# Patient Record
Sex: MALE | Race: WHITE | Employment: OTHER | URBAN - METROPOLITAN AREA
[De-identification: names, ages, dates, MRNs, and addresses within clinical notes are randomized per-mention and may not be internally consistent; named-entity substitution may affect disease eponyms.]

---

## 2018-01-18 NOTE — MISCELLANEOUS
Dear Jeremiah Hebert : We missed you for your originally scheduled neurological followup appointment with Talat Maloney  Please call at your earliest convenience to reschedule this appointment      Sincerely,   Jacob Dorado      Extension: [phone]       Electronically signed by:Carol Davila   May 20 2016  3:19PM EST Co-author

## 2019-03-17 ENCOUNTER — HOSPITAL ENCOUNTER (INPATIENT)
Facility: HOSPITAL | Age: 84
LOS: 2 days | Discharge: NURSING, REHAB OR LONG-TERM CARE | DRG: 689 | End: 2019-03-20
Attending: EMERGENCY MEDICINE | Admitting: INTERNAL MEDICINE
Payer: COMMERCIAL

## 2019-03-17 ENCOUNTER — APPOINTMENT (EMERGENCY)
Dept: CT IMAGING | Facility: HOSPITAL | Age: 84
DRG: 689 | End: 2019-03-17
Payer: COMMERCIAL

## 2019-03-17 DIAGNOSIS — Z78.9 DECREASED ACTIVITIES OF DAILY LIVING (ADL): Primary | ICD-10-CM

## 2019-03-17 DIAGNOSIS — I10 HTN (HYPERTENSION): ICD-10-CM

## 2019-03-17 DIAGNOSIS — R45.851 SUICIDAL IDEATIONS: ICD-10-CM

## 2019-03-17 DIAGNOSIS — N39.0 URINARY TRACT INFECTION: ICD-10-CM

## 2019-03-17 LAB
ALBUMIN SERPL BCP-MCNC: 3 G/DL (ref 3.5–5)
ALP SERPL-CCNC: 60 U/L (ref 46–116)
ALT SERPL W P-5'-P-CCNC: 25 U/L (ref 12–78)
AMPHETAMINES SERPL QL SCN: NEGATIVE
ANION GAP SERPL CALCULATED.3IONS-SCNC: 9 MMOL/L (ref 4–13)
APAP SERPL-MCNC: <2 UG/ML (ref 10–30)
AST SERPL W P-5'-P-CCNC: 11 U/L (ref 5–45)
ATRIAL RATE: 81 BPM
BACTERIA UR QL AUTO: ABNORMAL /HPF
BARBITURATES UR QL: NEGATIVE
BASOPHILS # BLD AUTO: 0.11 THOUSANDS/ΜL (ref 0–0.1)
BASOPHILS NFR BLD AUTO: 2 % (ref 0–1)
BENZODIAZ UR QL: NEGATIVE
BILIRUB SERPL-MCNC: 1.1 MG/DL (ref 0.2–1)
BILIRUB UR QL STRIP: NEGATIVE
BUN SERPL-MCNC: 18 MG/DL (ref 5–25)
CALCIUM SERPL-MCNC: 8.8 MG/DL (ref 8.3–10.1)
CHLORIDE SERPL-SCNC: 103 MMOL/L (ref 100–108)
CLARITY UR: ABNORMAL
CO2 SERPL-SCNC: 28 MMOL/L (ref 21–32)
COCAINE UR QL: NEGATIVE
COLOR UR: YELLOW
CREAT SERPL-MCNC: 1.18 MG/DL (ref 0.6–1.3)
EOSINOPHIL # BLD AUTO: 0.08 THOUSAND/ΜL (ref 0–0.61)
EOSINOPHIL NFR BLD AUTO: 1 % (ref 0–6)
ERYTHROCYTE [DISTWIDTH] IN BLOOD BY AUTOMATED COUNT: 13.5 % (ref 11.6–15.1)
ETHANOL SERPL-MCNC: <3 MG/DL (ref 0–3)
GFR SERPL CREATININE-BSD FRML MDRD: 54 ML/MIN/1.73SQ M
GLUCOSE SERPL-MCNC: 106 MG/DL (ref 65–140)
GLUCOSE UR STRIP-MCNC: NEGATIVE MG/DL
HCT VFR BLD AUTO: 35.2 % (ref 36.5–49.3)
HGB BLD-MCNC: 11.6 G/DL (ref 12–17)
HGB UR QL STRIP.AUTO: ABNORMAL
IMM GRANULOCYTES # BLD AUTO: 0.04 THOUSAND/UL (ref 0–0.2)
IMM GRANULOCYTES NFR BLD AUTO: 1 % (ref 0–2)
KETONES UR STRIP-MCNC: ABNORMAL MG/DL
LEUKOCYTE ESTERASE UR QL STRIP: ABNORMAL
LYMPHOCYTES # BLD AUTO: 1.02 THOUSANDS/ΜL (ref 0.6–4.47)
LYMPHOCYTES NFR BLD AUTO: 18 % (ref 14–44)
MCH RBC QN AUTO: 34.9 PG (ref 26.8–34.3)
MCHC RBC AUTO-ENTMCNC: 33 G/DL (ref 31.4–37.4)
MCV RBC AUTO: 106 FL (ref 82–98)
METHADONE UR QL: NEGATIVE
MONOCYTES # BLD AUTO: 0.77 THOUSAND/ΜL (ref 0.17–1.22)
MONOCYTES NFR BLD AUTO: 13 % (ref 4–12)
NEUTROPHILS # BLD AUTO: 3.75 THOUSANDS/ΜL (ref 1.85–7.62)
NEUTS SEG NFR BLD AUTO: 65 % (ref 43–75)
NITRITE UR QL STRIP: POSITIVE
NON-SQ EPI CELLS URNS QL MICRO: ABNORMAL /HPF
NRBC BLD AUTO-RTO: 0 /100 WBCS
OPIATES UR QL SCN: NEGATIVE
P AXIS: 53 DEGREES
PCP UR QL: NEGATIVE
PH UR STRIP.AUTO: 6 [PH]
PLATELET # BLD AUTO: 315 THOUSANDS/UL (ref 149–390)
PMV BLD AUTO: 12.9 FL (ref 8.9–12.7)
POTASSIUM SERPL-SCNC: 4.4 MMOL/L (ref 3.5–5.3)
PR INTERVAL: 144 MS
PROT SERPL-MCNC: 6.1 G/DL (ref 6.4–8.2)
PROT UR STRIP-MCNC: ABNORMAL MG/DL
QRS AXIS: 32 DEGREES
QRSD INTERVAL: 90 MS
QT INTERVAL: 402 MS
QTC INTERVAL: 466 MS
RBC # BLD AUTO: 3.32 MILLION/UL (ref 3.88–5.62)
RBC #/AREA URNS AUTO: ABNORMAL /HPF
SALICYLATES SERPL-MCNC: <3 MG/DL (ref 3–20)
SODIUM SERPL-SCNC: 140 MMOL/L (ref 136–145)
SP GR UR STRIP.AUTO: 1.01 (ref 1–1.03)
T WAVE AXIS: 23 DEGREES
THC UR QL: NEGATIVE
TSH SERPL DL<=0.05 MIU/L-ACNC: 6.53 UIU/ML (ref 0.36–3.74)
UROBILINOGEN UR QL STRIP.AUTO: 0.2 E.U./DL
VENTRICULAR RATE: 81 BPM
WBC # BLD AUTO: 5.77 THOUSAND/UL (ref 4.31–10.16)
WBC #/AREA URNS AUTO: ABNORMAL /HPF

## 2019-03-17 PROCEDURE — 93005 ELECTROCARDIOGRAM TRACING: CPT

## 2019-03-17 PROCEDURE — 87086 URINE CULTURE/COLONY COUNT: CPT | Performed by: NURSE PRACTITIONER

## 2019-03-17 PROCEDURE — 85025 COMPLETE CBC W/AUTO DIFF WBC: CPT | Performed by: NURSE PRACTITIONER

## 2019-03-17 PROCEDURE — 80053 COMPREHEN METABOLIC PANEL: CPT | Performed by: NURSE PRACTITIONER

## 2019-03-17 PROCEDURE — 84439 ASSAY OF FREE THYROXINE: CPT | Performed by: INTERNAL MEDICINE

## 2019-03-17 PROCEDURE — 87186 SC STD MICRODIL/AGAR DIL: CPT | Performed by: NURSE PRACTITIONER

## 2019-03-17 PROCEDURE — 81001 URINALYSIS AUTO W/SCOPE: CPT | Performed by: NURSE PRACTITIONER

## 2019-03-17 PROCEDURE — 74177 CT ABD & PELVIS W/CONTRAST: CPT

## 2019-03-17 PROCEDURE — 36415 COLL VENOUS BLD VENIPUNCTURE: CPT | Performed by: NURSE PRACTITIONER

## 2019-03-17 PROCEDURE — 80329 ANALGESICS NON-OPIOID 1 OR 2: CPT | Performed by: NURSE PRACTITIONER

## 2019-03-17 PROCEDURE — 99285 EMERGENCY DEPT VISIT HI MDM: CPT

## 2019-03-17 PROCEDURE — 84443 ASSAY THYROID STIM HORMONE: CPT | Performed by: NURSE PRACTITIONER

## 2019-03-17 PROCEDURE — 80307 DRUG TEST PRSMV CHEM ANLYZR: CPT | Performed by: NURSE PRACTITIONER

## 2019-03-17 PROCEDURE — 87077 CULTURE AEROBIC IDENTIFY: CPT | Performed by: NURSE PRACTITIONER

## 2019-03-17 PROCEDURE — 71260 CT THORAX DX C+: CPT

## 2019-03-17 PROCEDURE — 70450 CT HEAD/BRAIN W/O DYE: CPT

## 2019-03-17 PROCEDURE — 80320 DRUG SCREEN QUANTALCOHOLS: CPT | Performed by: NURSE PRACTITIONER

## 2019-03-17 RX ORDER — OLANZAPINE 10 MG/1
5 INJECTION, POWDER, LYOPHILIZED, FOR SOLUTION INTRAMUSCULAR ONCE
Status: DISCONTINUED | OUTPATIENT
Start: 2019-03-17 | End: 2019-03-20 | Stop reason: HOSPADM

## 2019-03-17 RX ADMIN — IOHEXOL 100 ML: 350 INJECTION, SOLUTION INTRAVENOUS at 17:16

## 2019-03-17 NOTE — ED RE-EVALUATION NOTE
Patient medically clear for psychiatric evaluation and admission     Vernona Merlin, CRNP  03/17/19 1946

## 2019-03-17 NOTE — ED PROVIDER NOTES
History  Chief Complaint   Patient presents with    Flank Pain     PT PRESENTS TO ED WITH LEFT SIDED FLANK OAIN THAT STARTED A FEW DAYS AGO  -N/V/D     This is a 19-year-old male patient that is brought in by EMS  He is accompanied by his sister and his brother-in-law  They brought him for evaluation of depression  His sister is very concerned because he has been saying things that he is having suicidal thoughts  She reports that he has never done this in the past she is concerned about his well-being because he lives alone  Today he would not allow her in the house reports that he does not want to be bothered  Patient's sister reports that she called him on the phone within the last day and he reported being very down  She reports that his house is very unkempt  He continues to decline services such as meals on wheels and patient aide companies  Reports that he also has not seen a doctor for quite some time  Reports that he does not take any medication and that he will not likely continue any medication  She has been trying to get him to go into an assisted living where 1 of his grandchildren work but he has not agreed to do that at this point  Patient admittedly has depressed thoughts and thoughts of suicide but has no definitive plan  He does live alone  On a side note the patient has slight left sided pain  He reports that he slipped and the banister age abdomen the side a couple days ago  They did have this evaluated urgent care and nothing really came of it  I will perform further evaluation with CT of the abdomen to evaluate for any internal organ injury or any other internal problem such as cancer  Patient has not been evaluated for some time so will check laboratory studies obtain CT of the head make sure there is no intracranial mass or any other organic medical issues  Following workup if this is negative I will consult crisis for suicidal thoughts    The patient's sister is concerned enough about his wellbeing that she would agree to petition the [place] if the patient will not agree to sign voluntarily  None       History reviewed  No pertinent past medical history  History reviewed  No pertinent surgical history  History reviewed  No pertinent family history  I have reviewed and agree with the history as documented  Social History     Tobacco Use    Smoking status: Former Smoker    Smokeless tobacco: Never Used   Substance Use Topics    Alcohol use: Yes    Drug use: Never        Review of Systems   Constitutional: Negative for diaphoresis, fatigue and fever  HENT: Negative for congestion, ear pain, nosebleeds and sore throat  Eyes: Negative for photophobia, pain, discharge and visual disturbance  Respiratory: Negative for cough, choking, chest tightness, shortness of breath and wheezing  Cardiovascular: Negative for chest pain and palpitations  Gastrointestinal: Negative for abdominal distention, abdominal pain, diarrhea and vomiting  Genitourinary: Negative for dysuria, flank pain and frequency  Musculoskeletal: Negative for back pain, gait problem and joint swelling  Skin: Negative for color change and rash  Neurological: Negative for dizziness, syncope and headaches  Psychiatric/Behavioral: Negative for behavioral problems and confusion  The patient is not nervous/anxious  All other systems reviewed and are negative  Physical Exam  Physical Exam   Constitutional: He is oriented to person, place, and time  He appears well-developed  He appears cachectic  No distress  HENT:   Head: Normocephalic and atraumatic  Eyes: Pupils are equal, round, and reactive to light  Neck: Normal range of motion  Neck supple  Cardiovascular: Normal rate, regular rhythm, normal heart sounds and normal pulses  PMI is not displaced  Some tenderness along the anterior rib structures on the left side over the lower costal margin    No ecchymosis or crepitus  Pulmonary/Chest: Effort normal and breath sounds normal  No respiratory distress  Abdominal: Soft  He exhibits no distension  There is no tenderness  There is no guarding, no CVA tenderness and negative Marinelli's sign  Musculoskeletal: Normal range of motion  Lymphadenopathy:     He has no cervical adenopathy  Neurological: He is alert and oriented to person, place, and time  Skin: Skin is warm and dry  No rash noted  He is not diaphoretic  No pallor  Psychiatric: He exhibits a depressed mood  He expresses suicidal ideation  He expresses no suicidal plans  Vitals reviewed        Vital Signs  ED Triage Vitals   Temperature Pulse Respirations Blood Pressure SpO2   03/17/19 1512 03/17/19 1512 03/17/19 1512 03/17/19 1512 03/17/19 1512   97 6 °F (36 4 °C) 84 16 167/77 97 %      Temp Source Heart Rate Source Patient Position - Orthostatic VS BP Location FiO2 (%)   03/17/19 1512 03/17/19 1512 03/18/19 0304 03/17/19 1512 --   Oral Monitor Sitting Right arm       Pain Score       03/17/19 1512       5           Vitals:    03/18/19 0725 03/18/19 0729 03/18/19 1520 03/18/19 2303   BP: 152/75  149/66 162/94   Pulse: 65  63 89   Patient Position - Orthostatic VS: Lying Lying Sitting        qSOFA     Row Name 03/18/19 2303 03/18/19 1520 03/18/19 0725 03/18/19 0330 03/18/19 0304    Altered mental status GCS < 15              Respiratory Rate > / =22    0  0  0  0    Systolic BP < / =615  0  0  0  0  0    Q Sofa Score    0  0  0  0    Row Name 03/17/19 1830 03/17/19 1715 03/17/19 1615 03/17/19 1512       Altered mental status GCS < 15             Respiratory Rate > / =22  0  0  0  0     Systolic BP < / =549  0  0  0  0     Q Sofa Score  0  0  0  0           Visual Acuity      ED Medications  Medications   OLANZapine (ZyPREXA) IM injection 5 mg (0 mg Intramuscular Hold 3/18/19 0305)   enoxaparin (LOVENOX) subcutaneous injection 40 mg (40 mg Subcutaneous Given 3/18/19 0829)   cefTRIAXone (ROCEPHIN) 1,000 mg in dextrose 5 % 50 mL IVPB (1,000 mg Intravenous New Bag 3/19/19 0336)   sodium chloride 0 9 % infusion (75 mL/hr Intravenous New Bag 3/19/19 0058)   iohexol (OMNIPAQUE) 350 MG/ML injection (SINGLE-DOSE) 100 mL (100 mL Intravenous Given 3/17/19 1716)   ceftriaxone (ROCEPHIN) 1 g/50 mL in dextrose IVPB (1,000 mg Intravenous New Bag 3/18/19 0302)       Diagnostic Studies  Results Reviewed     Procedure Component Value Units Date/Time    T4, free [[id number]]  (Normal) Collected:  03/17/19 1619    Lab Status:  Final result Specimen:  Blood from Arm, Left Updated:  03/18/19 1104     Free T4 0 80 ng/dL     Urine culture [[id number]] Collected:  03/17/19 2200    Lab Status: In process Specimen:  Urine Updated:  03/17/19 2200    Urine Microscopic [[id number]]  (Abnormal) Collected:  03/17/19 1812    Lab Status:  Final result Specimen:  Urine, Clean Catch Updated:  03/17/19 1833     RBC, UA       Field obscured, unable to enumerate     /hpf     WBC, UA       Field obscured, unable to enumerate     /hpf     Epithelial Cells       Field obscured, unable to enumerate     /hpf     Bacteria, UA       Field obscured, unable to enumerate     /hpf    Rapid drug screen, urine [[id number]]  (Normal) Collected:  03/17/19 1812    Lab Status:  Final result Specimen:  Urine, Clean Catch Updated:  03/17/19 1827     Amph/Meth UR Negative     Barbiturate Ur Negative     Benzodiazepine Urine Negative     Cocaine Urine Negative     Methadone Urine Negative     Opiate Urine Negative     PCP Ur Negative     THC Urine Negative    Narrative:       FOR MEDICAL PURPOSES ONLY  IF CONFIRMATION NEEDED PLEASE CONTACT THE LAB WITHIN 5 DAYS    Drug Screen Cutoff Levels:  AMPHETAMINE/METHAMPHETAMINES  1000 ng/mL  BARBITURATES     200 ng/mL  BENZODIAZEPINES     200 ng/mL  COCAINE      300 ng/mL  METHADONE      300 ng/mL  OPIATES      300 ng/mL  PHENCYCLIDINE     25 ng/mL  THC       50 ng/mL    UA w Reflex to Microscopic [[id number]]  (Abnormal) Collected:  03/17/19 1812    Lab Status:  Final result Specimen:  Urine, Clean Catch Updated:  03/17/19 1823     Color, UA Yellow     Clarity, UA Cloudy     Specific [place], UA 1 010     pH, UA 6 0     Leukocytes, UA Moderate     Nitrite, UA Positive     Protein, UA 30 (1+) mg/dl      Glucose, UA Negative mg/dl      Ketones, UA Trace mg/dl      Urobilinogen, UA 0 2 E U /dl      Bilirubin, UA Negative     Blood, UA Moderate    Salicylate level [[id number]]  (Abnormal) Collected:  03/17/19 1619    Lab Status:  Final result Specimen:  Blood from Arm, Left Updated:  58/43/47 6247     Salicylate Lvl <3 mg/dL     TSH [[id number]]  (Abnormal) Collected:  03/17/19 1619    Lab Status:  Final result Specimen:  Blood from Arm, Left Updated:  03/17/19 1700     TSH 3RD GENERATON 6 526 uIU/mL     Narrative:       Patients undergoing fluorescein dye angiography may retain small amounts of fluorescein in the body for 48-72 hours post procedure  Samples containing fluorescein can produce falsely depressed TSH values  If the patient had this procedure,a specimen should be resubmitted post fluorescein clearance      Acetaminophen level [[id number]]  (Abnormal) Collected:  03/17/19 1619    Lab Status:  Final result Specimen:  Blood from Arm, Left Updated:  03/17/19 1700     Acetaminophen Level <2 0 ug/mL     Comprehensive metabolic panel [[id number]]  (Abnormal) Collected:  03/17/19 1619    Lab Status:  Final result Specimen:  Blood from Arm, Left Updated:  03/17/19 1648     Sodium 140 mmol/L      Potassium 4 4 mmol/L      Chloride 103 mmol/L      CO2 28 mmol/L      ANION GAP 9 mmol/L      BUN 18 mg/dL      Creatinine 1 18 mg/dL      Glucose 106 mg/dL      Calcium 8 8 mg/dL      AST 11 U/L      ALT 25 U/L      Alkaline Phosphatase 60 U/L      Total Protein 6 1 g/dL      Albumin 3 0 g/dL      Total Bilirubin 1 10 mg/dL      eGFR 54 ml/min/1 73sq m     Narrative:       National Kidney Disease Education Program recommendations are as follows:  GFR calculation is accurate only with a steady state creatinine  Chronic Kidney disease less than 60 ml/min/1 73 sq  meters  Kidney failure less than 15 ml/min/1 73 sq  meters  Ethanol [[id number]]  (Normal) Collected:  03/17/19 1619    Lab Status:  Final result Specimen:  Blood from Arm, Left Updated:  03/17/19 1643     Ethanol Lvl <3 mg/dL     CBC and differential [[id number]]  (Abnormal) Collected:  03/17/19 1619    Lab Status:  Final result Specimen:  Blood from Arm, Left Updated:  03/17/19 1627     WBC 5 77 Thousand/uL      RBC 3 32 Million/uL      Hemoglobin 11 6 g/dL      Hematocrit 35 2 %       fL      MCH 34 9 pg      MCHC 33 0 g/dL      RDW 13 5 %      MPV 12 9 fL      Platelets 684 Thousands/uL      nRBC 0 /100 WBCs      Neutrophils Relative 65 %      Immat GRANS % 1 %      Lymphocytes Relative 18 %      Monocytes Relative 13 %      Eosinophils Relative 1 %      Basophils Relative 2 %      Neutrophils Absolute 3 75 Thousands/µL      Immature Grans Absolute 0 04 Thousand/uL      Lymphocytes Absolute 1 02 Thousands/µL      Monocytes Absolute 0 77 Thousand/µL      Eosinophils Absolute 0 08 Thousand/µL      Basophils Absolute 0 11 Thousands/µL                  CT head without contrast   Final Result by Ella Pineda MD (03/17 1815)      No acute intracranial abnormality  Workstation performed: [id number]         CT chest abdomen pelvis w contrast   Final Result by Ella Pineda MD (03/17 1841)      1  No significant abnormality in the chest    2   Mild goiter  3   Two calculi, measuring 5 mm and 6 mm, in the right ureter at the junction of its proximal and mid thirds, where there appears to be a small stricture  Moderate right hydronephrosis              Workstation performed: [id number]                    Procedures  ECG 12 Lead Documentation  Date/Time: 3/17/2019 4:25 PM  Performed by: LUCI Ashley  Authorized by: LUCI Ashley     Indications / Diagnosis:  Depression  ECG reviewed by me, the ED Provider: yes    Patient location:  ED  Previous ECG:     Previous ECG:  Unavailable  Interpretation:     Interpretation: normal    Rate:     ECG rate:  81    ECG rate assessment: normal    Rhythm:     Rhythm: sinus rhythm    Ectopy:     Ectopy: PVCs      PVCs:  Infrequent  QRS:     QRS axis:  Normal  Conduction:     Conduction: normal    ST segments:     ST segments:  Normal  T waves:     T waves: normal             Phone Contacts  ED Phone Contact    ED Course  ED Course as of Mar 19 0620   Deborah Muir Mar 17, 2019   2320 Nursing increases say that the patient no longer wants to stay  He is getting agitated and loud with staff  I contacted the patient's sister who is willing to petition the [facility]  Number of Diagnoses or Management Options  Colonization status: new and requires workup  Decreased activities of daily living (ADL): new and requires workup  Depressed: new and requires workup  Verbalizes suicidal thoughts: new and requires workup  Diagnosis management comments: At this point the patient's workup is unremarkable  His urine appears as if it is colonized which is not uncommon given his age  Will follow culture and if he does culture 1 specific bacteria will follow up and treat accordingly  He does have some right hydronephrosis  Unable to determine the chronicity of this  No exquisite CVA tenderness over the right side  May need a urology consultation in the future    No affect on BUN and creatinine       Amount and/or Complexity of Data Reviewed  Clinical lab tests: reviewed and ordered  Tests in the radiology section of CPT®: reviewed and ordered  Tests in the medicine section of CPT®: reviewed and ordered  Obtain history from someone other than the patient: yes  Independent visualization of images, tracings, or specimens: yes    Patient Progress  Patient progress: stable      Disposition  Final diagnoses: Decreased activities of daily living (ADL)   Urinary tract infection     Time reflects when diagnosis was documented in both MDM as applicable and the Disposition within this note     Time User Action Codes Description Comment    3/17/2019  8:45 PM Karson Morales suicidal thoughts     3/17/2019  8:45 PM Agnes Levon [F32 9] Depressed     3/17/2019  8:46 PM Eli Janeth Add [R68 89] Decreased activities of daily living (ADL)     3/17/2019  8:47 PM Agnes Levon [Z22 9] Colonization status     3/17/2019  8:47 PM Eli Janeth Modify [Z22 9] Colonization status Urinary tract    3/18/2019  2:24 AM Tretha Tammi Add [N39 0] Urinary tract infection     3/18/2019  2:24 AM Tretha Tammi Modify [F32 9] Depressed     3/18/2019  2:24 AM Tretha Tammi Remove [R45 851] Verbalizes suicidal thoughts     3/18/2019  2:24 AM Tretha Tammi Modify [R68 89] Decreased activities of daily living (ADL)     3/18/2019  2:24 AM Tretha Tammi Remove [F32 9] Depressed     3/18/2019  2:24 AM Tretha Tammi Remove [Z22 9] Colonization status Urinary tract    3/18/2019  3:59 AM Joseph Taylor Add [R45 851] Suicidal ideations       ED Disposition     ED Disposition Condition Date/Time Comment    Admit Stable Mon Mar 18, 2019  2:30 AM Case was discussed with Hospitalist and the patient's admission status was agreed to be observation status to the service of Dr Claudia NAVA Documentation      Most Recent Value   Sending MD Dr Garcia      Follow-up Information    None         There are no discharge medications for this patient  No discharge procedures on file      ED Provider  Electronically Signed by           Candelario Lock  03/19/19 5957

## 2019-03-18 PROBLEM — N30.00 ACUTE CYSTITIS: Status: ACTIVE | Noted: 2019-03-18

## 2019-03-18 LAB
ANION GAP SERPL CALCULATED.3IONS-SCNC: 9 MMOL/L (ref 4–13)
BASOPHILS # BLD AUTO: 0.11 THOUSANDS/ΜL (ref 0–0.1)
BASOPHILS NFR BLD AUTO: 2 % (ref 0–1)
BUN SERPL-MCNC: 21 MG/DL (ref 5–25)
CALCIUM SERPL-MCNC: 8.9 MG/DL (ref 8.3–10.1)
CHLORIDE SERPL-SCNC: 107 MMOL/L (ref 100–108)
CO2 SERPL-SCNC: 25 MMOL/L (ref 21–32)
CREAT SERPL-MCNC: 1.21 MG/DL (ref 0.6–1.3)
EOSINOPHIL # BLD AUTO: 0.14 THOUSAND/ΜL (ref 0–0.61)
EOSINOPHIL NFR BLD AUTO: 2 % (ref 0–6)
ERYTHROCYTE [DISTWIDTH] IN BLOOD BY AUTOMATED COUNT: 13.4 % (ref 11.6–15.1)
GFR SERPL CREATININE-BSD FRML MDRD: 52 ML/MIN/1.73SQ M
GLUCOSE SERPL-MCNC: 109 MG/DL (ref 65–140)
HCT VFR BLD AUTO: 35.5 % (ref 36.5–49.3)
HGB BLD-MCNC: 11.9 G/DL (ref 12–17)
IMM GRANULOCYTES # BLD AUTO: 0.04 THOUSAND/UL (ref 0–0.2)
IMM GRANULOCYTES NFR BLD AUTO: 1 % (ref 0–2)
LYMPHOCYTES # BLD AUTO: 1.83 THOUSANDS/ΜL (ref 0.6–4.47)
LYMPHOCYTES NFR BLD AUTO: 26 % (ref 14–44)
MCH RBC QN AUTO: 35.4 PG (ref 26.8–34.3)
MCHC RBC AUTO-ENTMCNC: 33.5 G/DL (ref 31.4–37.4)
MCV RBC AUTO: 106 FL (ref 82–98)
MONOCYTES # BLD AUTO: 1 THOUSAND/ΜL (ref 0.17–1.22)
MONOCYTES NFR BLD AUTO: 14 % (ref 4–12)
NEUTROPHILS # BLD AUTO: 3.85 THOUSANDS/ΜL (ref 1.85–7.62)
NEUTS SEG NFR BLD AUTO: 55 % (ref 43–75)
NRBC BLD AUTO-RTO: 0 /100 WBCS
PLATELET # BLD AUTO: 327 THOUSANDS/UL (ref 149–390)
PMV BLD AUTO: 12.6 FL (ref 8.9–12.7)
POTASSIUM SERPL-SCNC: 4.2 MMOL/L (ref 3.5–5.3)
RBC # BLD AUTO: 3.36 MILLION/UL (ref 3.88–5.62)
SODIUM SERPL-SCNC: 141 MMOL/L (ref 136–145)
T4 FREE SERPL-MCNC: 0.8 NG/DL (ref 0.76–1.46)
WBC # BLD AUTO: 6.97 THOUSAND/UL (ref 4.31–10.16)

## 2019-03-18 PROCEDURE — G8987 SELF CARE CURRENT STATUS: HCPCS

## 2019-03-18 PROCEDURE — G8979 MOBILITY GOAL STATUS: HCPCS

## 2019-03-18 PROCEDURE — G8988 SELF CARE GOAL STATUS: HCPCS

## 2019-03-18 PROCEDURE — 97167 OT EVAL HIGH COMPLEX 60 MIN: CPT

## 2019-03-18 PROCEDURE — 80048 BASIC METABOLIC PNL TOTAL CA: CPT | Performed by: INTERNAL MEDICINE

## 2019-03-18 PROCEDURE — 85025 COMPLETE CBC W/AUTO DIFF WBC: CPT | Performed by: INTERNAL MEDICINE

## 2019-03-18 PROCEDURE — G8978 MOBILITY CURRENT STATUS: HCPCS

## 2019-03-18 PROCEDURE — 97163 PT EVAL HIGH COMPLEX 45 MIN: CPT

## 2019-03-18 RX ORDER — SODIUM CHLORIDE 9 MG/ML
75 INJECTION, SOLUTION INTRAVENOUS CONTINUOUS
Status: DISCONTINUED | OUTPATIENT
Start: 2019-03-18 | End: 2019-03-19

## 2019-03-18 RX ORDER — SODIUM CHLORIDE 9 MG/ML
125 INJECTION, SOLUTION INTRAVENOUS CONTINUOUS
Status: DISCONTINUED | OUTPATIENT
Start: 2019-03-18 | End: 2019-03-18

## 2019-03-18 RX ADMIN — CEFTRIAXONE SODIUM 1000 MG: 10 INJECTION, POWDER, FOR SOLUTION INTRAVENOUS at 03:02

## 2019-03-18 RX ADMIN — ENOXAPARIN SODIUM 40 MG: 40 INJECTION SUBCUTANEOUS at 08:29

## 2019-03-18 RX ADMIN — SODIUM CHLORIDE 125 ML/HR: 0.9 INJECTION, SOLUTION INTRAVENOUS at 04:44

## 2019-03-18 RX ADMIN — SODIUM CHLORIDE 75 ML/HR: 0.9 INJECTION, SOLUTION INTRAVENOUS at 12:02

## 2019-03-18 NOTE — H&P
H&P- Rusty Guerrero 5/8/1928, 80 y o  male MRN: [id number]    Unit/Bed#: -01 Encounter: [id number]    Primary Care Provider: Vance Ferris DO   Date and time admitted to hospital: 3/17/2019  3:07 PM        * Acute cystitis  Assessment & Plan  Patient presenting with confusion and was reportedly depressed with passive suicidal ideations prior to presentation  Workup was revealing for a grossly positive UA concerning for UTI  -patient himself does endorse confusion which has been worse as of late as well as decreased energy levels  -he was given ceftriaxone in the emergency department for UTI  Plan to continue ceftriaxone 1 g Q 24 hours  Will hydrate patient with normal saline  PT/OT ordered  He will remain on 1:1 pending psychiatric evaluation  Suicide precautions ordered  VTE PROPHYLAXIS:  + SCDs, Lovenox    CODE STATUS: FULL    Anticipated Length of Stay:  Patient will be admitted on an Observation basis with an anticipated length of stay of  2 midnights  Justification for Hospital Stay: UTI, confusion, passive S I      CHIEF COMPLAINT  confusion, depression/passive SI, weakness    HISTORY OF PRESENT ILLNESS  Rusty Guerrero this is a 80-year-old male with no known past medical history  Patient has not seen any physicians over the last few years  He lives by himself at home  He was brought in by family members for reportedly having increased depression symptoms and passive suicidal ideations  His house was reported to be unkempt as well  Initially, he was been evaluated for depression symptoms when he was noted to be disoriented and only oriented to self in the emergency department  Workup revealed grossly positive UA  He was given ceftriaxone for concern of UTI thought to exacerbate his confusion  Medicine was consulted for admission and management given UTI symptoms as well as depression/passive suicidal ideations  During my evaluation, patient was alert oriented x3    He denies any urinary symptoms  Denies any chest pain or shortness of breath  Denies any increased cough  Reports appetite has been good  He does report increased fatigue over the last few days as well as occasional disorientation and not knowing where he is which he himself  endorses  He denies any fevers or chills  REVIEW OF SYSTEMS  A comprehensive 10 point review system conducted all negative except as mentioned in HPI       PMH/PSH    History reviewed  No pertinent past medical history  History reviewed  No pertinent surgical history  ALLERGIES  No Known Allergies    HOME MEDICATIONS  No current facility-administered medications on file prior to encounter  No current outpatient medications on file prior to encounter  SOCIAL HISTORY     Marital Status:    Substance Use History:   Social History     Substance and Sexual Activity   Alcohol Use Yes     Social History     Tobacco Use   Smoking Status Former Smoker   Smokeless Tobacco Never Used     Social History     Substance and Sexual Activity   Drug Use Never       FAMILY HISTORY  Noncontributory to current presentation    OBJECTIVE    Vitals:   Blood Pressure: 156/72 (03/18/19 0304)  Pulse: 73 (03/18/19 0304)  Temperature: 97 6 °F (36 4 °C) (03/17/19 1512)  Temp Source: Oral (03/17/19 1512)  Respirations: 16 (03/18/19 0304)  Height: 5' 6" (167 6 cm) (03/17/19 1512)  Weight - Scale: 57 4 kg (126 lb 8 7 oz) (03/17/19 1512)  SpO2: 96 % (03/18/19 0304)    GENERAL: AAO x 3  NAD  HEENT: atraumatic, normocephalic  PERLAA  EOMI  NECK- supple, no JVD, no lymphadenopathy, no thryomegaly  CHEST:  Clear to auscultation bilaterally  CVS: S1, S2   Regular rate and rhythm, no murmurs, rub or gallops  ABDOMEN:  Soft, nontender, nondistended, normoactive bowel sounds  NEUROLOGICAL: CN II -XI grossly intact  No focal motor or sensory deficits  EXTREMITIES: No cyanosis/clubbing or edema      LAB DATA    Results Reviewed     Procedure Component Value Units Date/Time    Urine culture [[id number]] Collected:  03/17/19 2200    Lab Status: In process Specimen:  Urine Updated:  03/17/19 2200    Urine Microscopic [[id number]]  (Abnormal) Collected:  03/17/19 1812    Lab Status:  Final result Specimen:  Urine, Clean Catch Updated:  03/17/19 1833     RBC, UA       Field obscured, unable to enumerate     /hpf     WBC, UA       Field obscured, unable to enumerate     /hpf     Epithelial Cells       Field obscured, unable to enumerate     /hpf     Bacteria, UA       Field obscured, unable to enumerate     /hpf    Rapid drug screen, urine [[id number]]  (Normal) Collected:  03/17/19 1812    Lab Status:  Final result Specimen:  Urine, Clean Catch Updated:  03/17/19 1827     Amph/Meth UR Negative     Barbiturate Ur Negative     Benzodiazepine Urine Negative     Cocaine Urine Negative     Methadone Urine Negative     Opiate Urine Negative     PCP Ur Negative     THC Urine Negative    Narrative:       FOR MEDICAL PURPOSES ONLY  IF CONFIRMATION NEEDED PLEASE CONTACT THE LAB WITHIN 5 DAYS    Drug Screen Cutoff Levels:  AMPHETAMINE/METHAMPHETAMINES  1000 ng/mL  BARBITURATES     200 ng/mL  BENZODIAZEPINES     200 ng/mL  COCAINE      300 ng/mL  METHADONE      300 ng/mL  OPIATES      300 ng/mL  PHENCYCLIDINE     25 ng/mL  THC       50 ng/mL    UA w Reflex to Microscopic [[id number]]  (Abnormal) Collected:  03/17/19 1812    Lab Status:  Final result Specimen:  Urine, Clean Catch Updated:  03/17/19 1823     Color, UA Yellow     Clarity, UA Cloudy     Specific Foss, UA 1 010     pH, UA 6 0     Leukocytes, UA Moderate     Nitrite, UA Positive     Protein, UA 30 (1+) mg/dl      Glucose, UA Negative mg/dl      Ketones, UA Trace mg/dl      Urobilinogen, UA 0 2 E U /dl      Bilirubin, UA Negative     Blood, UA Moderate    Salicylate level [[id number]]  (Abnormal) Collected:  03/17/19 1619    Lab Status:  Final result Specimen:  Blood from Arm, Left Updated:  03/17/19 1706 Salicylate Lvl <3 mg/dL     TSH [[id number]]  (Abnormal) Collected:  03/17/19 1619    Lab Status:  Final result Specimen:  Blood from Arm, Left Updated:  03/17/19 1700     TSH 3RD GENERATON 6 526 uIU/mL     Narrative:       Patients undergoing fluorescein dye angiography may retain small amounts of fluorescein in the body for 48-72 hours post procedure  Samples containing fluorescein can produce falsely depressed TSH values  If the patient had this procedure,a specimen should be resubmitted post fluorescein clearance  Acetaminophen level [[id number]]  (Abnormal) Collected:  03/17/19 1619    Lab Status:  Final result Specimen:  Blood from Arm, Left Updated:  03/17/19 1700     Acetaminophen Level <2 0 ug/mL     Comprehensive metabolic panel [[id number]]  (Abnormal) Collected:  03/17/19 1619    Lab Status:  Final result Specimen:  Blood from Arm, Left Updated:  03/17/19 1648     Sodium 140 mmol/L      Potassium 4 4 mmol/L      Chloride 103 mmol/L      CO2 28 mmol/L      ANION GAP 9 mmol/L      BUN 18 mg/dL      Creatinine 1 18 mg/dL      Glucose 106 mg/dL      Calcium 8 8 mg/dL      AST 11 U/L      ALT 25 U/L      Alkaline Phosphatase 60 U/L      Total Protein 6 1 g/dL      Albumin 3 0 g/dL      Total Bilirubin 1 10 mg/dL      eGFR 54 ml/min/1 73sq m     Narrative:       National Kidney Disease Education Program recommendations are as follows:  GFR calculation is accurate only with a steady state creatinine  Chronic Kidney disease less than 60 ml/min/1 73 sq  meters  Kidney failure less than 15 ml/min/1 73 sq  meters      Ethanol [[id number]]  (Normal) Collected:  03/17/19 1619    Lab Status:  Final result Specimen:  Blood from Arm, Left Updated:  03/17/19 1643     Ethanol Lvl <3 mg/dL     CBC and differential [[id number]]  (Abnormal) Collected:  03/17/19 1619    Lab Status:  Final result Specimen:  Blood from Arm, Left Updated:  03/17/19 1627     WBC 5 77 Thousand/uL      RBC 3 32 Million/uL      Hemoglobin 11 6 g/dL Hematocrit 35 2 %       fL      MCH 34 9 pg      MCHC 33 0 g/dL      RDW 13 5 %      MPV 12 9 fL      Platelets 490 Thousands/uL      nRBC 0 /100 WBCs      Neutrophils Relative 65 %      Immat GRANS % 1 %      Lymphocytes Relative 18 %      Monocytes Relative 13 %      Eosinophils Relative 1 %      Basophils Relative 2 %      Neutrophils Absolute 3 75 Thousands/µL      Immature Grans Absolute 0 04 Thousand/uL      Lymphocytes Absolute 1 02 Thousands/µL      Monocytes Absolute 0 77 Thousand/µL      Eosinophils Absolute 0 08 Thousand/µL      Basophils Absolute 0 11 Thousands/µL           EKG, Pathology, and Other Studies Reviewed on Admission  Total time spent in the process of admission, completion of records, counseling, coordination of care, discussion with patient/family was approximately 35 minutes  Jose Alberto Cee MD  HOSPITALIST SERVICES  3/18/2019      PLEASE NOTE:  This encounter was completed utilizing the [employer] Direct Speech Voice Recognition Software  Grammatical errors, random word insertions, pronoun errors and incomplete sentences are occasional consequences of the system due to software limitations, ambient noise and hardware issues  These may be missed by proof reading prior to affixing electronic signature  Any questions or concerns about the content, text or information contained within the body of this dictation should be directly addressed to the physician for clarification  Please do not hesitate to call me directly if you have any any questions or concerns

## 2019-03-18 NOTE — PLAN OF CARE
Problem: Potential for Falls  Goal: Patient will remain free of falls  Description  INTERVENTIONS:  - Assess patient frequently for physical needs  -  Identify cognitive and physical deficits and behaviors that affect risk of falls    -  [place] fall precautions as indicated by assessment   - Educate patient/family on patient safety including physical limitations  - Instruct patient to call for assistance with activity based on assessment  - Modify environment to reduce risk of injury  - Consider OT/PT consult to assist with strengthening/mobility  Outcome: Progressing     Problem: Prexisting or High Potential for Compromised Skin Integrity  Goal: Skin integrity is maintained or improved  Description  INTERVENTIONS:  - Identify patients at risk for skin breakdown  - Assess and monitor skin integrity  - Assess and monitor nutrition and hydration status  - Monitor labs (i e  albumin)  - Assess for incontinence   - Turn and reposition patient  - Assist with mobility/ambulation  - Relieve pressure over bony prominences  - Avoid friction and shearing  - Provide appropriate hygiene as needed including keeping skin clean and dry  - Evaluate need for skin moisturizer/barrier cream  - Collaborate with interdisciplinary team (i e  Nutrition, Rehabilitation, etc )   - Patient/family teaching  Outcome: Progressing     Problem: PAIN - ADULT  Goal: Verbalizes/displays adequate comfort level or baseline comfort level  Description  Interventions:  - Encourage patient to monitor pain and request assistance  - Assess pain using appropriate pain scale  - Administer analgesics based on type and severity of pain and evaluate response  - Implement non-pharmacological measures as appropriate and evaluate response  - Consider cultural and social influences on pain and pain management  - Notify physician/advanced practitioner if interventions unsuccessful or patient reports new pain  Outcome: Progressing     Problem: SAFETY ADULT  Goal: Patient will remain free of falls  Description  INTERVENTIONS:  - Assess patient frequently for physical needs  -  Identify cognitive and physical deficits and behaviors that affect risk of falls    -  [place] fall precautions as indicated by assessment   - Educate patient/family on patient safety including physical limitations  - Instruct patient to call for assistance with activity based on assessment  - Modify environment to reduce risk of injury  - Consider OT/PT consult to assist with strengthening/mobility  Outcome: Progressing  Goal: Maintain or return to baseline ADL function  Description  INTERVENTIONS:  -  Assess patient's ability to carry out ADLs; assess patient's baseline for ADL function and identify physical deficits which impact ability to perform ADLs (bathing, care of mouth/teeth, toileting, grooming, dressing, etc )  - Assess/evaluate cause of self-care deficits   - Assess range of motion  - Assess patient's mobility; develop plan if impaired  - Assess patient's need for assistive devices and provide as appropriate  - Encourage maximum independence but intervene and supervise when necessary  ¯ Involve family in performance of ADLs  ¯ Assess for home care needs following discharge   ¯ Request OT consult to assist with ADL evaluation and planning for discharge  ¯ Provide patient education as appropriate  Outcome: Progressing  Goal: Maintain or return mobility status to optimal level  Description  INTERVENTIONS:  - Assess patient's baseline mobility status (ambulation, transfers, stairs, etc )    - Identify cognitive and physical deficits and behaviors that affect mobility  - Identify mobility aids required to assist with transfers and/or ambulation (gait belt, sit-to-stand, lift, walker, cane, etc )  - [place] fall precautions as indicated by assessment  - Record patient progress and toleration of activity level on Mobility SBAR; progress patient to next Phase/Stage  - Instruct patient to call for assistance with activity based on assessment  - Request Rehabilitation consult to assist with strengthening/weightbearing, etc   Outcome: Progressing     Problem: Depression - IP adult  Goal: Effects of depression will be minimized  Description  INTERVENTIONS:  - Assess impact of patient's symptoms on level of functioning, self-care needs and offer support as indicated  - Assess patient/family knowledge of depression, impact on illness and need for teaching  - Provide emotional support, presence and reassurance  - Assess for possible suicidal thoughts, ideation or self-harm  If patient expresses suicidal thoughts or statements do not leave alone, notify physician/AP immediately, initiate Suicide Precautions, and determine need for continual observation   - Initiate consults and referrals as appropriate (a mental health professional, Spiritual Care)  - Administer medication as ordered  Outcome: Progressing     Problem: SELF HARM  Goal: Effect of psychiatric condition will be minimized and patient will be protected from self harm  Description  INTERVENTIONS:  - Assess impact of patient's symptoms on level of functioning, self-care needs and offer support as indicated  - Assess patient/family knowledge of depression, impact on illness and need for teaching  - Provide emotional support, presence and reassurance  - Assess for possible suicidal thoughts, ideation or self-harm  If patient expresses suicidal thoughts or statements do not leave alone, notify physician/AP immediately, initiate suicide precautions, and determine need for continual observation    - initiate consults and referrals as appropriate (a mental health professional, Spiritual Care  Outcome: Progressing

## 2019-03-18 NOTE — CONSULTS
REQUIRED DOCUMENTATION:     1  This service was provided via Telemedicine  2  Provider located at [place]  3  TeleMed provider: Andria Truong MD   4  Identify all parties in room with patient during tele consult:two family members and patient  5  After connecting through televideo, patient was identified by name and date of birth and assistant checked wristband  Patient was then informed that this was a Telemedicine visit and that the exam was being conducted confidentially over secure lines  My office door was closed  No one else was in the room  Patient acknowledged consent and understanding of privacy and security of the Telemedicine visit, and gave us permission to have the assistant stay in the room in order to assist with the history and to conduct the exam   I informed the patient that I have reviewed their record in Epic and presented the opportunity for them to ask any questions regarding the visit today  The patient agreed to participate  Consultation - Israel Natenamorenita Sharan Mckeon 80 y o  male MRN: [id number]  Unit/Bed#: -01 Encounter: [id number]      Chief Complaint: "How are you?"    History of Present Illness   Physician Requesting Consult: Donne Gosselin, MD  Reason for Consult / Principal Problem: suicidal ideation    Raya Madsen is a 80 y o  male presents with confusion and SI and found to have UTI on evaluation  Per notes he was living alone, has not had any medical follow-up in years and per ED evaluation was disoriented  On review of RN notes he denies SI or HI at intake but on crisis eval he reported being depressed and wanting to sleep and not wake up  Family is concerned about him living by himself as he has reported SI to them and when they went to his house to check on him it was unkempt and he would not let them in   Per Gretta Valdes case management his sister was advised to contact the [facility] for a 302 after patient did not recall signing a 201 or agreeing to treatment  He had sundowing behaviors noted in ED prior to arrival to his room  On interview he is sitting in bed with family present  Notable cognitive deficits on orientation, recall, concentration and denies current depression or SI  Does admit to depression some days and with no preceeding event but no AVH  Spoke with sister (who is POA) after evaluation and she reports his living situation (being alone) is a trigger for his mood and we discuss how geropsychiatry admission may not be as warranted as a referral to skilled nursing or rehab after a PT/OT evaluation in the hospital       Psychiatric Review Of Systems:  sleep: no  appetite changes: no  weight changes: no  energy/anergy: yes  interest/pleasure/anhedonia: no  somatic symptoms: no  anxiety/panic: no  emmett: no  guilty/hopeless: yes  self injurious behavior/risky behavior: no    Historical Information   Past Psychiatric History:   None  Currently in treatment with no  Past Suicide attempts: no  Past Violent behavior: no  Past Psychiatric medication trial: none    Substance Abuse History:  none   I am unable to assess the patient for substance use within the past 12 months as they are unable or unwilling to answer  Reviewed records   History of IP/OP rehabilitation program: no  Smoking history: former  Family Psychiatric History:   denies    Social History  Marital history:   Living arrangement, social support: The patient lives in home with self  Occupational History: retired  Functioning Relationships: good support system  Other Pertinent History: None    Traumatic History:   Abuse: denies    History reviewed  No pertinent past medical history      Medical Review Of Systems:  Review of Systems - Negative except HPI    Meds/Allergies   all current active meds have been reviewed and current meds:   Current Facility-Administered Medications   Medication Dose Route Frequency    [START ON 3/19/2019] cefTRIAXone (ROCEPHIN) 1,000 mg in dextrose 5 % 50 mL IVPB  1,000 mg Intravenous Q24H    enoxaparin (LOVENOX) subcutaneous injection 40 mg  40 mg Subcutaneous Daily    OLANZapine (ZyPREXA) IM injection 5 mg  5 mg Intramuscular Once    sodium chloride 0 9 % infusion  75 mL/hr Intravenous Continuous     No Known Allergies    Objective   Vital signs in last 24 hours:  Temp:  [97 6 °F (36 4 °C)-98 1 °F (36 7 °C)] 98 1 °F (36 7 °C)  HR:  [65-86] 65  Resp:  [16-18] 18  BP: (152-170)/(72-81) 152/75      Intake/Output Summary (Last 24 hours) at 3/18/2019 1059  Last data filed at 3/18/2019 0916  Gross per 24 hour   Intake 360 ml   Output 450 ml   Net -90 ml       Mental Status Evaluation:  Appearance:  bearded and disheveled   Behavior:  cooperative   Speech:  loud   Mood:  normal   Affect:  constricted   Language: fluent   Thought Process:  normal   Associations: concrete associations   Thought Content:  normal   Perceptual Disturbances: None   Risk Potential: Suicidal Ideations none and Homicidal Ideations none   Sensorium:  person, place and situation   Memory:  Short term recall impaired   Cognition:  dementia present   Consciousness:  alert and awake    Attention: attention span appeared shorter than expected for age   Intellect: not examined   Fund of Knowledge: awareness of current events: poor   Insight:  limited   Judgment: limited   Muscle Strength and Tone: in bed   Gait/Station: in bed   Motor Activity: no abnormal movements     Lab Results:    I have personally reviewed all pertinent laboratory/tests results    Most Recent Labs:   Lab Results   Component Value Date    WBC 6 97 03/18/2019    RBC 3 36 (L) 03/18/2019    HGB 11 9 (L) 03/18/2019    HCT 35 5 (L) 03/18/2019     03/18/2019    RDW 13 4 03/18/2019    NEUTROABS 3 85 03/18/2019    SODIUM 141 03/18/2019    K 4 2 03/18/2019     03/18/2019    CO2 25 03/18/2019    BUN 21 03/18/2019    CREATININE 1 21 03/18/2019    GLUC 109 03/18/2019    CALCIUM 8 9 03/18/2019    AST 11 03/17/2019    ALT 25 03/17/2019    ALKPHOS 60 03/17/2019    TP 6 1 (L) 03/17/2019    ALB 3 0 (L) 03/17/2019    TBILI 1 10 (H) 03/17/2019    [id number] 6 526 (H) 03/17/2019    FREET4 0 80 03/17/2019     CT head: Diffusely decreased white matter attenuation typical of chronic microangiopathic changes  Code Status: )Level 1 - Full Code    Assessment/Plan     Assessment:  Mary Linares is a 80 y o  male   Diagnosis:  Neurocognitive disorder r/o vascular dementia  Plan:   1  At this time would recommend PT/OT eval, neuropsychology evaluation regarding ability to live on his own  Would appear benefit from placement to snf as opposed to geripsychiatry given his cognitive impairment  2  At high risk for developing delirium in the hospital, opitimize sleep/wake cycle, encourage OOB, cont treatment of UTI  3  At this time would not pursue 302 or 201, sister in agreement with PT/OT eval and skilled placement    Risks, benefits and possible side effects of Medications:   Patient does not verbalize understanding at this time and will require further explanation             Lion Boyd MD

## 2019-03-18 NOTE — ASSESSMENT & PLAN NOTE
Patient presenting with confusion and was reportedly depressed with passive suicidal ideations prior to presentation  Workup was revealing for a grossly positive UA concerning for UTI  -patient himself does endorse confusion which has been worse as of late as well as decreased energy levels  -he was given ceftriaxone in the emergency department for UTI  Plan to continue ceftriaxone 1 g Q 24 hours  Will hydrate patient with normal saline  PT/OT ordered  He will remain on 1:1 pending psychiatric evaluation  Suicide precautions ordered

## 2019-03-18 NOTE — OCCUPATIONAL THERAPY NOTE
Occupational Therapy Evaluation        Patient Name: Saleem Gary  RAC'S Date: 3/18/2019       03/18/19 8701   Note Type   Note type Eval only   Restrictions/Precautions   Weight Bearing Precautions Per Order No   Other Precautions Suicidal;1:1;Impulsive; Bed Alarm;Telemetry; Fall Risk   Pain Assessment   Pain Assessment FLACC   Pain Rating: FLACC (Rest) - Face 0   Pain Rating: FLACC (Rest) - Legs 0   Pain Rating: FLACC (Rest) - Activity 0   Pain Rating: FLACC (Rest) - Cry 0   Pain Rating: FLACC (Rest) - Consolability 0   Score: FLACC (Rest) 0   Pain Rating: FLACC (Activity) - Face 0   Pain Rating: FLACC (Activity) - Legs 0   Pain Rating: FLACC (Activity) - Activity 0   Pain Rating: FLACC (Activity) - Cry 0   Pain Rating: FLACC (Activity) - Consolability 0   Score: FLACC (Activity) 0   Home Living   Additional Comments Given patient's baseline cognitive impairments, patient unreliable/poor historian  Lifestyle   Autonomy Patient unable to provide  history, chart review indicates that patient lives alone,  ER notes indicate: " Patient lives alone and stated that he he doesn't remember to care for his ADL's sometimes  And isn't caring for his home he stated what gets done gets done the other stuff has to wait " ,  Given patient's baseline cognitive impairments, patient unreliable/poor historian  Psychosocial   Psychosocial (WDL) X   Patient Behaviors/Mood Anxious;Irritable; Uncooperative   Ability to Express Feelings Able to express   Ability to Express Needs Able to express   Ability to Express Thoughts Able to express   Ability to Understand Others Understands   ADL   Eating Assistance 5  Supervision/Setup   Grooming Assistance 5  Supervision/Setup   UB Bathing Assistance 4  Minimal Assistance   LB Bathing Assistance 4  Minimal Assistance   [address] Assistance 4  Minimal Assistance   Bed Mobility   Additional Comments Pt observed standing in shower upon arrival; 1:1 present   Transfers   Stand to Sit 5  Supervision   Additional items Assist x 1; Increased time required;Verbal cues   Additional Comments Patient observed OOB iambulating in bathroom  Patient was anxious  and insisting on  taking a shower,  patient was set up for sssink level ADLs and was able to brush his teeth, wash hands and face independently post set up  Functional Mobility   Functional Mobility 4  Minimal assistance   Additional items   (no AD)   Balance   Static Sitting Good   Dynamic Sitting Good   Static Standing Good   Dynamic Standing Good   Activity Tolerance   Activity Tolerance Treatment limited secondary to agitation   Nurse Made Aware Sharon Parisi RN verbalized pt appropriate for PT evaluation; post-session; pt seated at edge of bed; 1:1 present   RUE Assessment   RUE Assessment WFL   LUE Assessment   LUE Assessment WFL   Hand Function   Gross Motor Coordination Impaired   Cognition   Overall Cognitive Status Impaired   Arousal/Participation Alert; Uncooperative;Persistent stimuli required   Attention Within functional limits   Orientation Level Oriented to person   Memory Decreased recall of biographical information;Decreased short term memory;Decreased recall of recent events;Decreased recall of precautions   Following Commands Follows one step commands inconsistently   Comments Given patient's baseline cognitive impairments, patient unreliable/poor historian  Noted periods of agitation throughout evaluation   Assessment   Limitation Decreased ADL status; Decreased UE ROM; Decreased Safe judgement during ADL;Decreased endurance;Decreased self-care trans;Decreased high-level ADLs   Prognosis Good   Assessment Patient is a 80 y o  male seen for OT evaluation s/p admit to [address] [facility] on 3/17/2019 w/Acute cystitis   Commorbidities affecting patient's functional performance at time of assessment include:Suicidal ideations, acute cystitis  Orders placed for OT evaluation and treatment  Performed at least two patient identifiers during session including name and wristband  Prior to admission, Patient unable to provide  history, chart review indicates that patient lives alone,  ER notes indicate: " Patient lives alone and stated that he he doesn't remember to care for his ADL's sometimes  And isn't caring for his home he stated what gets done gets done the other stuff has to wait " ,  Given patient's baseline cognitive impairments, patient unreliable/poor historian  Personal factors affecting patient at time of initial evaluation include: limited caregiver support, limited insight into deficits, non compliance, flat affect, decreased initiation and engagement, difficulty performing ADLs and difficulty performing IADLs  Upon evaluation, patient requires set up, contact guard, close supervision and minimal  assist for UB ADLs, set up, contact guard, close supervision and minimal  assist for LB ADLs, transfers and functional ambulation in room and bathroom with set up, contact guard and close supervision assist, without AD  Occupational performance is affected by the following deficits: anxiety, orientation, attention span, irritability, flat affect, impulsive behavior, dynamic sit/ stand balance deficit with poor standing tolerance time for self care and functional mobility, decreased activity tolerance, impaired memory, impaired problem solving, decreased safety awareness, impaired interpersonal skills and decreased coping skills  Therapist completed  extensive additional review of medical records and additional review of physical, cognitive or psychosocial history, clinical examination identifying 5 or more performance deficits, clinical decision making of a high complexity , consistent with a high complexity level evaluation   Patient to benefit from continued Occupational Therapy treatment while in the hospital to address deficits as defined above and maximize level of functional independence with ADLs and functional mobility  Goals   Patient Goals none stated besides, being anxious about leaving the hospital soon, and waiting for a ride   Plan   Treatment Interventions ADL retraining;Visual perceptual retraining;UE strengthening/ROM; Endurance training;Patient/family training;Equipment evaluation/education; Compensatory technique education;Continued evaluation; Energy conservation; Activityengagement   Goal Expiration Date 04/01/19   OT Frequency 3-5x/wk   Recommendation   OT Discharge Recommendation Short Term Rehab   Barthel Index   Feeding 5   Bathing 0   Grooming Score 0   Dressing Score 5   Bladder Score 10   Bowels Score 10   Toilet Use Score 5   Transfers (Bed/Chair) Score 10   Mobility (Level Surface) Score 0   Stairs Score 0   Barthel Index Score 45   Modified [place] Scale   Modified [place] Scale 3     Occupational Performance areas to address include: grooming , bathing/ shower, dressing, toilet hygiene, transfer to all surfaces, functional mobility, emergency response, health maintenance, medication routine/ management, IADLs: safety procedures, IADLs: meal prep/ clean up, Leisure Participation and Social participation  From OT standpoint, recommendation at time of d/c would be Short Term Rehab, s/p Inpatient Behavioral Unit treatment  Patient  will engage in ongoing cognitive assessment  to assist with safe d/c planning/recommendations  Patient will identify 3 positive coping strategies to assist with emotional regulation and management  Patient will engage in depression screen/ leisure interest check list to identify s/s of depression and facilitate patients involvement in play/ leisure tasks  Patient will engage in ongoing visual perceptual assessments, screens and activities to r/o visual perceptual impairments affecting functional performance       Patient will follow 100% simple one step directives without verbal cues  Patient  will engage in activity configuration activity with good participation and mod I to increase time management skills and improve participation in a structured routine to improve overall quality of life

## 2019-03-18 NOTE — UTILIZATION REVIEW
Initial Clinical Review    Admission: Date/Time/Statement: OBS  3/18  0230 converted to IP 3/18  1021  For treatment of confusion , and SI , additional W/U needed     Admitting Physician AMY CHEN    Level of Care Med Surg    Estimated length of stay More than 2 Midnights    Certification I certify that inpatient services are medically necessary for this patient for a duration of greater than two midnights  See H&P and MD Progress Notes for additional information about the patient's course of treatment  ED: Date/Time/Mode of Arrival:   ED Arrival Information     Expected Arrival Acuity Means of Arrival Escorted By Service Admission Type    - 3/17/2019 15:05 Urgent Walk-In Family Member General Medicine Urgent    Arrival Complaint    flank pain        Chief Complaint:   Chief Complaint   Patient presents with    Flank Pain     PT PRESENTS TO ED WITH LEFT SIDED FLANK OAIN THAT STARTED A FEW DAYS AGO  -N/V/D     Assessment/Plan: 79 yo male to ED from home w/ inc depression and passive SI   Only oriented to self in ED   Family brought pt in , house unkempt   Work up in ED revealed UTI   C/o inc fatigue occas disorientation over the last few days   Will admit for acute cystitis cont ceftriaxone , IVF , PT OT , psych consult 1:1 , suicide precautions   ED Vital Signs:   ED Triage Vitals   Temperature Pulse Respirations Blood Pressure SpO2   03/17/19 1512 03/17/19 1512 03/17/19 1512 03/17/19 1512 03/17/19 1512   97 6 °F (36 4 °C) 84 16 167/77 97 %      Temp Source Heart Rate Source Patient Position - Orthostatic VS BP Location FiO2 (%)   03/17/19 1512 03/17/19 1512 03/18/19 0304 03/17/19 1512 --   Oral Monitor Sitting Right arm       Pain Score       03/17/19 1512       5        Wt Readings from Last 1 Encounters:   03/17/19 57 4 kg (126 lb 8 7 oz)     Vital Signs (abnormal): wnl   Pertinent Labs/Diagnostic Test Results:    Total Protein 6 1Low  6 4 - 8 2 g/dL      Albumin 3 0Low  3 5 - 5 0 g/dL     Total Bilirubin 1  10High  0 20 - 1 00 mg/dL       Hemoglobin 11 6Low  12 0 - 17 0 g/dL     Hematocrit 35 2Low  36 5 - 49 3     CT head- wnl   CT chest -      1   No significant abnormality in the chest   2   Mild goiter  3   Two calculi, measuring 5 mm and 6 mm, in the right ureter at the junction of its proximal and mid thirds, where there appears to be a small stricture   Moderate right hydronephrosis  EKG- NSR w/ PVC    ED Treatment:   Medication Administration from 03/17/2019 1504 to 03/18/2019 0319       Date/Time Order Dose Route Action Action by Comments     03/17/2019 1716 iohexol (OMNIPAQUE) 350 MG/ML injection (SINGLE-DOSE) 100 mL 100 mL Intravenous Given Tean SOTO Debbie      03/18/2019 0305 OLANZapine (ZyPREXA) IM injection 5 mg 0 mg Intramuscular Hold Vincente Baumgarten, RN      03/18/2019 0302 ceftriaxone (ROCEPHIN) 1 g/50 mL in dextrose IVPB 1,000 mg Intravenous New Bag Vincente Baumgarten, RN         Past Medical/Surgical History: Active Ambulatory Problems     Diagnosis Date Noted    No Active Ambulatory Problems     No Additional Past Medical History     Admitting Diagnosis: Urinary tract infection [N39 0]  Flank pain [R10 9]  Decreased activities of daily living (ADL) [R68 89]  Age/Sex: 80 y o  male  Admission Orders:  Scheduled Meds:   Current Facility-Administered Medications:  [START ON 3/19/2019] cefTRIAXone 1,000 mg Intravenous Q24H     enoxaparin 40 mg Subcutaneous Daily     OLANZapine 5 mg Intramuscular Once     sodium chloride 125 mL/hr Intravenous Continuous  Last Rate: 125 mL/hr (03/18/19 0444)     Reg diet - finger food   Cont OBS 1:1  SCD  OT PT eval   Psych consult - pending   Up w/ assist   BRP     Network Utilization Review Department  Phone: [phone]; Fax [phone]  [email or web address]  ATTENTION: Please call with any questions or concerns to [phone]  and carefully listen to the prompts so that you are directed to the right person     Send all requests for admission clinical reviews, approved or denied determinations and any other requests to fax [phone]   All voicemails are confidential

## 2019-03-18 NOTE — PLAN OF CARE
Problem: PHYSICAL THERAPY ADULT  Goal: Performs mobility at highest level of function for planned discharge setting  See evaluation for individualized goals  Description  Treatment/Interventions: Functional transfer training, LE strengthening/ROM, Therapeutic exercise, Endurance training, Patient/family training, Equipment eval/education, Bed mobility, Gait training, Spoke to nursing, OT          See flowsheet documentation for full assessment, interventions and recommendations  Note:   Prognosis: Fair  Problem List: Decreased endurance, Impaired balance, Decreased mobility, Decreased cognition, Decreased safety awareness, Impaired judgement  Assessment: Pt is 80 y o  male seen for PT evaluation s/p admit to [facility] on 3/17/2019 w/ Acute cystitis  PT consulted to assess pt's functional mobility and d/c needs  Order placed for PT eval and tx, w/ up w/ A order  History reviewed  No pertinent past medical history  Given patient's baseline cognitive impairments, patient unreliable/poor historian  Personal factors affecting pt at time of IE include: decreased cognition, limited home support, decreased initiation and engagement, impulsivity, limited insight into impairments, inability to perform IADLs, inability to perform ADLs and inability to live alone  Please find objective findings from PT assessment regarding body systems outlined above with impairments and limitations including impaired balance, decreased endurance, gait deviations, decreased activity tolerance, decreased functional mobility tolerance, decreased safety awareness, impaired judgement, fall risk and decreased cognition  The following objective measures performed on IE also reveal limitations: Barthel Index: 45/100 and Modified [place]: 3 (moderate disability)   Pt's clinical presentation is currently unstable/unpredictable seen in pt's presentation of ongoing medical management/monitoring, need for input for task focus and mobility technique/safety w/ inconsistent retention of education received and limited home support (patient lives at home alone)  Pt to benefit from continued PT tx to address deficits as defined above and maximize level of functional independent mobility and consistency  From PT/mobility standpoint, recommendation at time of d/c would be STR pending progress in order to facilitate return to PLOF  Barriers to Discharge: Decreased caregiver support     Recommendation: Short-term skilled PT     PT - OK to Discharge: Yes(when medically cleared; if to STR)    See flowsheet documentation for full assessment

## 2019-03-18 NOTE — ED RE-EVALUATION NOTE
Re-evaluated the patient, he knows his name, he believes he 79years old, does not know what hospital he is in  Does not know he is in a emergency department  Does not know the date or the month  He does not know what year this is  He believes it is 1  Discussed with patient he has a urinary tract infection  He is demented  The patient is clearly sundowning  Patient will require placement due to his dementia because his family believes he cannot take care of himself  Patient is not committable for dementia  Patient apparently signed a voluntary commitment but does not remember signing the commitment  No degree of psychiatric care will help with patient with dementia  He requires medical admission  He probably requires placement  Will discussed with hospitalist service         René Jj MD  03/18/19 5610       René Jj MD  03/18/19 1525

## 2019-03-18 NOTE — PHYSICAL THERAPY NOTE
Physical Therapy Evaluation     Patient's Name: Michael Amato    Admitting Diagnosis  Urinary tract infection [N39 0]  Flank pain [R10 9]  Decreased activities of daily living (ADL) [R68 89]    Problem List  Patient Active Problem List   Diagnosis    Acute cystitis    Suicidal ideations       Past Medical History  History reviewed  No pertinent past medical history  Past Surgical History  History reviewed  No pertinent surgical history  03/18/19 0957   Note Type   Note type Eval only   Pain Assessment   Pain Assessment FLACC   Pain Rating: FLACC (Rest) - Face 0   Pain Rating: FLACC (Rest) - Legs 0   Pain Rating: FLACC (Rest) - Activity 0   Pain Rating: FLACC (Rest) - Cry 0   Pain Rating: FLACC (Rest) - Consolability 0   Score: FLACC (Rest) 0   Pain Rating: FLACC (Activity) - Face 0   Pain Rating: FLACC (Activity) - Legs 0   Pain Rating: FLACC (Activity) - Activity 0   Pain Rating: FLACC (Activity) - Cry 0   Pain Rating: FLACC (Activity) - Consolability 0   Score: FLACC (Activity) 0   Home Living   Additional Comments Given patient's baseline cognitive impairments, patient unreliable/poor historian  Prior Function   Lives With Alone  (per chart review)   Comments Given patient's baseline cognitive impairments, patient unreliable/poor historian  Restrictions/Precautions   Weight Bearing Precautions Per Order No   Braces or Orthoses   (none per chart review)   Other Precautions Fall Risk;Multiple lines; Chair Alarm; Bed Alarm;Cognitive;Suicidal;1:1;Impulsive;Agitated   General   Additional Pertinent History HPI: 20-year-old male patient brought to [address] by EMS for evaluation of depression   Family/Caregiver Present No   Cognition   Overall Cognitive Status Impaired   Arousal/Participation Alert   Orientation Level Oriented to person   Memory Decreased recall of precautions;Decreased recall of recent events;Decreased short term memory;Decreased recall of biographical information Following Commands Follows one step commands inconsistently   Comments Given patient's baseline cognitive impairments, patient unreliable/poor historian  Noted periods of agitation throughout evaluation   RUE Assessment   RUE Assessment WFL  (based on functional assessment)   LUE Assessment   LUE Assessment WFL  (based on functional assessment)   RLE Assessment   RLE Assessment WFL  (grossly assessed with functional mobility: at least 4/5)   LLE Assessment   LLE Assessment WFL  (grossly assessed with functional mobility: at least 4/5)   Coordination   Movements are Fluid and Coordinated 1   Sensation   (Unable to assess)   Bed Mobility   Additional Comments Pt observed standing in shower upon arrival; 1:1 present   Transfers   Stand to Sit 5  Supervision   Additional items Assist x 1; Increased time required;Verbal cues   Ambulation/Elevation   Gait pattern Excessively slow; Short stride;Decreased foot clearance; Wide CAROLINE; Improper Weight shift   Gait Assistance 5  Supervision   Additional items Assist x 1;Verbal cues   Assistive Device None   Distance 4' + 6' + 10'   Stair Management Assistance Not tested   Balance   Static Sitting Good   Dynamic Sitting Fair +   Static Standing Fair   Dynamic Standing Fair -   Ambulatory Fair -   Endurance Deficit   Endurance Deficit Yes   Activity Tolerance   Activity Tolerance Treatment limited secondary to agitation  (Follows one step commands inconsistently)   Medical Staff Made Naveed Boo OT present throughout evaluation   Nurse Neida Solares RN verbalized pt appropriate for PT evaluation; post-session; pt seated at edge of bed; 1:1 present   Assessment   Prognosis Fair   Problem List Decreased endurance; Impaired balance;Decreased mobility; Decreased cognition;Decreased safety awareness; Impaired judgement   Assessment Pt is 80 y o  male seen for PT evaluation s/p admit to [facility] on 3/17/2019 w/ Acute cystitis   PT consulted to assess pt's functional mobility and d/c needs  Order placed for PT eval and tx, w/ up w/ A order  History reviewed  No pertinent past medical history  Given patient's baseline cognitive impairments, patient unreliable/poor historian  Personal factors affecting pt at time of IE include: decreased cognition, limited home support, decreased initiation and engagement, impulsivity, limited insight into impairments, inability to perform IADLs, inability to perform ADLs and inability to live alone  Please find objective findings from PT assessment regarding body systems outlined above with impairments and limitations including impaired balance, decreased endurance, gait deviations, decreased activity tolerance, decreased functional mobility tolerance, decreased safety awareness, impaired judgement, fall risk and decreased cognition  The following objective measures performed on IE also reveal limitations: Barthel Index: 45/100 and Modified [place]: 3 (moderate disability)  Pt's clinical presentation is currently unstable/unpredictable seen in pt's presentation of ongoing medical management/monitoring, need for input for task focus and mobility technique/safety w/ inconsistent retention of education received and limited home support (patient lives at home alone)  Pt to benefit from continued PT tx to address deficits as defined above and maximize level of functional independent mobility and consistency  From PT/mobility standpoint, recommendation at time of d/c would be STR pending progress in order to facilitate return to PLOF     Barriers to Discharge Decreased caregiver support   Goals   Patient Goals none stated: pt presents with impaired cognition   STG Expiration Date 03/28/19   Short Term Goal #1 In 7-10 days: Perform all transfers with distant S to improve independence, Ambulate > 150 ft  with no AD with distant S w/o LOB and w/ normalized gait pattern 100% of the time, Increase all balance 1/2 grade to decrease risk for falls and PT to see and establish goals for bed mobility tasks and elevations when appropriate   Treatment Day 0   Plan   Treatment/Interventions Functional transfer training;LE strengthening/ROM; Therapeutic exercise; Endurance training;Patient/family training;Equipment eval/education; Bed mobility;Gait training;Spoke to nursing;OT   PT Frequency 2-3x/wk   Recommendation   Recommendation Short-term skilled PT   PT - OK to Discharge Yes  (when medically cleared; if to STR)   Modified [place] Scale   Modified [place] Scale 3   Barthel Index   Feeding 5   Bathing 0   Grooming Score 0   Dressing Score 5   Bladder Score 10   Bowels Score 10   Toilet Use Score 5   Transfers (Bed/Chair) Score 10   Mobility (Level Surface) Score 0   Stairs Score 0   Barthel Index Score 45         Jesús Avila, PT, DPT

## 2019-03-18 NOTE — PLAN OF CARE
Problem: OCCUPATIONAL THERAPY ADULT  Goal: Performs self-care activities at highest level of function for planned discharge setting  See evaluation for individualized goals  Description  Treatment Interventions: ADL retraining, Visual perceptual retraining, UE strengthening/ROM, Endurance training, Patient/family training, Equipment evaluation/education, Compensatory technique education, Continued evaluation, Energy conservation, Activityengagement          See flowsheet documentation for full assessment, interventions and recommendations  Note:   Limitation: Decreased ADL status, Decreased UE ROM, Decreased Safe judgement during ADL, Decreased endurance, Decreased self-care trans, Decreased high-level ADLs  Prognosis: Good  Assessment: Patient is a 80 y o  male seen for OT evaluation s/p admit to [address] [place] [address] on 3/17/2019 w/Acute cystitis  Commorbidities affecting patient's functional performance at time of assessment include:Suicidal ideations, acute cystitis  Orders placed for OT evaluation and treatment  Performed at least two patient identifiers during session including name and wristband  Prior to admission, Patient unable to provide  history, chart review indicates that patient lives alone,  ER notes indicate: " Patient lives alone and stated that he he doesn't remember to care for his ADL's sometimes  And isn't caring for his home he stated what gets done gets done the other stuff has to wait " ,  Given patient's baseline cognitive impairments, patient unreliable/poor historian  Personal factors affecting patient at time of initial evaluation include: limited caregiver support, limited insight into deficits, non compliance, flat affect, decreased initiation and engagement, difficulty performing ADLs and difficulty performing IADLs   Upon evaluation, patient requires set up, contact guard, close supervision and minimal  assist for UB ADLs, set up, contact guard, close supervision and minimal assist for LB ADLs, transfers and functional ambulation in room and bathroom with set up, contact guard and close supervision assist, without AD  Occupational performance is affected by the following deficits: anxiety, orientation, attention span, irritability, flat affect, impulsive behavior, dynamic sit/ stand balance deficit with poor standing tolerance time for self care and functional mobility, decreased activity tolerance, impaired memory, impaired problem solving, decreased safety awareness, impaired interpersonal skills and decreased coping skills  Therapist completed  extensive additional review of medical records and additional review of physical, cognitive or psychosocial history, clinical examination identifying 5 or more performance deficits, clinical decision making of a high complexity , consistent with a high complexity level evaluation  Patient to benefit from continued Occupational Therapy treatment while in the hospital to address deficits as defined above and maximize level of functional independence with ADLs and functional mobility        OT Discharge Recommendation: Short Term Rehab

## 2019-03-18 NOTE — PROGRESS NOTES
Note:  Patient would require more than 2 midnight stay because of need for IV antibiotic to treat his urinary tract infection also to place him in a locked dementia unit  He has underlying dementia  As a reviewed some of the records, he also has been having suicidal thoughts

## 2019-03-19 PROCEDURE — 97530 THERAPEUTIC ACTIVITIES: CPT

## 2019-03-19 PROCEDURE — 97535 SELF CARE MNGMENT TRAINING: CPT

## 2019-03-19 PROCEDURE — 97110 THERAPEUTIC EXERCISES: CPT

## 2019-03-19 RX ORDER — AMLODIPINE BESYLATE 5 MG/1
5 TABLET ORAL DAILY
Status: DISCONTINUED | OUTPATIENT
Start: 2019-03-19 | End: 2019-03-20 | Stop reason: HOSPADM

## 2019-03-19 RX ADMIN — SODIUM CHLORIDE 75 ML/HR: 0.9 INJECTION, SOLUTION INTRAVENOUS at 00:58

## 2019-03-19 RX ADMIN — ENOXAPARIN SODIUM 40 MG: 40 INJECTION SUBCUTANEOUS at 08:33

## 2019-03-19 RX ADMIN — CEFTRIAXONE SODIUM 1000 MG: 10 INJECTION, POWDER, FOR SOLUTION INTRAVENOUS at 03:36

## 2019-03-19 RX ADMIN — AMLODIPINE BESYLATE 5 MG: 5 TABLET ORAL at 12:03

## 2019-03-19 NOTE — OCCUPATIONAL THERAPY NOTE
OCCUPATIONAL THERAPY TREATMENT  NOTE         03/19/19 5790   Restrictions/Precautions   Weight Bearing Precautions Per Order No   Other Precautions Impulsive;1:1;Suicidal;Cognitive; Chair Alarm; Bed Alarm;Multiple lines;Telemetry; Fall Risk   Pain Assessment   Pain Assessment No/denies pain   Pain Score No Pain   Bed Mobility   Additional Comments Rec'd OOB   Transfers   Sit to Stand 5  Supervision   Additional items Assist x 1; Armrests; Increased time required;Verbal cues   Stand to Sit 5  Supervision   Additional items Assist x 1; Armrests;Trapeze bar;Verbal cues   Functional Mobility   Functional Mobility 4  Minimal assistance   Additional Comments CGA    Additional items   (No AD)   Therapeutic Excerise-Strength   UE Strength Yes   Right Upper Extremity- Strength   R Shoulder Flexion; Extension; External rotation; Internal rotation   R Elbow Elbow flexion;Elbow extension   R Weight/Reps/Sets 10 reps x 2   Left Upper Extremity-Strength   L Shoulder Flexion; Extension; External rotation; Internal rotation   L Elbow Elbow flexion;Elbow extension   L Weights/Reps/Sets 10 reps x 2   Cognition   Overall Cognitive Status Impaired   Arousal/Participation Alert; Cooperative   Attention Within functional limits   Memory Decreased recall of recent events   Following Commands Follows one step commands with increased time or repetition   Comments pt agreeable and cooperative throughout session   Assessment   Assessment Pt cooperative and agreeable to skilled OT services with focus on improving safety during functional mobility and self care skills  Pt seated in recliner  upon start of session  Pt pleasant and cooperative  No 1:1 present, however RN close by room  Pt sit <>stand with S and cues for hand placement  Able to ambulate with CGA and no use of AD  Slight shuffling gait noted with R LE  No dizziness reported upon standing/ambualtion   Pt demonstrated good  activity tolerance  during presented tasks  Pt reviewed/ performed HEP to assist with increased UB strength for improved independence and safety during func  tasks  Pt  educated in energy conservation techniques, use of LHR to assist with independence and safety in home, home safety awareness/ recommendations and safe mobility technique  Pt performance demonstrated fair  carryover of learned techniques and strategies to facilitate safety during self care and daily routine, however pt continues to demonstrate deficits in the areas safety awareness and functional mobility  Pt would continue to benefit from skilled OT POC to facilitate return to PLOF  Prior to leaving pt's room, therapist alerted YOLANDA Clarke to make her aware that pt did not have call arzola in room  Kristi GUERRERO proceeded to obtain call bell for pt  Plan   Treatment Interventions ADL retraining;Visual perceptual retraining;Functional transfer training;UE strengthening/ROM; Endurance training;Cognitive reorientation;Patient/family training;Equipment evaluation/education; Fine motor coordination activities; Compensatory technique education;Continued evaluation; Energy conservation; Activityengagement   Goal Expiration Date 04/01/19   OT Frequency 3-5x/wk   Recommendation   OT Discharge Recommendation Short Term Rehab   OT - OK to Discharge Yes  (when medically cleared)                                                     GERONIMO Crump/CARLI

## 2019-03-19 NOTE — PLAN OF CARE
Problem: Potential for Falls  Goal: Patient will remain free of falls  Description  INTERVENTIONS:  - Assess patient frequently for physical needs  -  Identify cognitive and physical deficits and behaviors that affect risk of falls    -  [place] fall precautions as indicated by assessment   - Educate patient/family on patient safety including physical limitations  - Instruct patient to call for assistance with activity based on assessment  - Modify environment to reduce risk of injury  - Consider OT/PT consult to assist with strengthening/mobility  Outcome: Progressing     Problem: Prexisting or High Potential for Compromised Skin Integrity  Goal: Skin integrity is maintained or improved  Description  INTERVENTIONS:  - Identify patients at risk for skin breakdown  - Assess and monitor skin integrity  - Assess and monitor nutrition and hydration status  - Monitor labs (i e  albumin)  - Assess for incontinence   - Turn and reposition patient  - Assist with mobility/ambulation  - Relieve pressure over bony prominences  - Avoid friction and shearing  - Provide appropriate hygiene as needed including keeping skin clean and dry  - Evaluate need for skin moisturizer/barrier cream  - Collaborate with interdisciplinary team (i e  Nutrition, Rehabilitation, etc )   - Patient/family teaching  Outcome: Progressing     Problem: PAIN - ADULT  Goal: Verbalizes/displays adequate comfort level or baseline comfort level  Description  Interventions:  - Encourage patient to monitor pain and request assistance  - Assess pain using appropriate pain scale  - Administer analgesics based on type and severity of pain and evaluate response  - Implement non-pharmacological measures as appropriate and evaluate response  - Consider cultural and social influences on pain and pain management  - Notify physician/advanced practitioner if interventions unsuccessful or patient reports new pain  Outcome: Progressing     Problem: SAFETY ADULT  Goal: Patient will remain free of falls  Description  INTERVENTIONS:  - Assess patient frequently for physical needs  -  Identify cognitive and physical deficits and behaviors that affect risk of falls    -  [place] fall precautions as indicated by assessment   - Educate patient/family on patient safety including physical limitations  - Instruct patient to call for assistance with activity based on assessment  - Modify environment to reduce risk of injury  - Consider OT/PT consult to assist with strengthening/mobility  Outcome: Progressing  Goal: Maintain or return to baseline ADL function  Description  INTERVENTIONS:  -  Assess patient's ability to carry out ADLs; assess patient's baseline for ADL function and identify physical deficits which impact ability to perform ADLs (bathing, care of mouth/teeth, toileting, grooming, dressing, etc )  - Assess/evaluate cause of self-care deficits   - Assess range of motion  - Assess patient's mobility; develop plan if impaired  - Assess patient's need for assistive devices and provide as appropriate  - Encourage maximum independence but intervene and supervise when necessary  ¯ Involve family in performance of ADLs  ¯ Assess for home care needs following discharge   ¯ Request OT consult to assist with ADL evaluation and planning for discharge  ¯ Provide patient education as appropriate  Outcome: Progressing  Goal: Maintain or return mobility status to optimal level  Description  INTERVENTIONS:  - Assess patient's baseline mobility status (ambulation, transfers, stairs, etc )    - Identify cognitive and physical deficits and behaviors that affect mobility  - Identify mobility aids required to assist with transfers and/or ambulation (gait belt, sit-to-stand, lift, walker, cane, etc )  - [place] fall precautions as indicated by assessment  - Record patient progress and toleration of activity level on Mobility SBAR; progress patient to next Phase/Stage  - Instruct patient to call for assistance with activity based on assessment  - Request Rehabilitation consult to assist with strengthening/weightbearing, etc   Outcome: Progressing     Problem: Depression - IP adult  Goal: Effects of depression will be minimized  Description  INTERVENTIONS:  - Assess impact of patient's symptoms on level of functioning, self-care needs and offer support as indicated  - Assess patient/family knowledge of depression, impact on illness and need for teaching  - Provide emotional support, presence and reassurance  - Assess for possible suicidal thoughts, ideation or self-harm  If patient expresses suicidal thoughts or statements do not leave alone, notify physician/AP immediately, initiate Suicide Precautions, and determine need for continual observation   - Initiate consults and referrals as appropriate (a mental health professional, Spiritual Care)  - Administer medication as ordered  Outcome: Progressing     Problem: SELF HARM  Goal: Effect of psychiatric condition will be minimized and patient will be protected from self harm  Description  INTERVENTIONS:  - Assess impact of patient's symptoms on level of functioning, self-care needs and offer support as indicated  - Assess patient/family knowledge of depression, impact on illness and need for teaching  - Provide emotional support, presence and reassurance  - Assess for possible suicidal thoughts, ideation or self-harm  If patient expresses suicidal thoughts or statements do not leave alone, notify physician/AP immediately, initiate suicide precautions, and determine need for continual observation    - initiate consults and referrals as appropriate (a mental health professional, Spiritual Care  Outcome: Progressing

## 2019-03-19 NOTE — PROGRESS NOTES
[facility] Internal Medicine Progress Note  Patient: Solo Redd 80 y o  male   MRN: [id number]  PCP: Chan Viramontes DO  Unit/Bed#: -01 Encounter: [id number]  Date Of Visit: 19    Assessment:    Principal Problem:    Acute cystitis  Active Problems:    Suicidal ideations      Plan:  Urinary tract infection  UA positive for UTI  Urine cultures preliminary growing gram-negative rods  Continue IV Rocephin at this time  Continue to monitor final urine cultures      Acute encephalopathy likely toxic metabolic in the setting of UTI and top of dementia  He was on one-to-one currently discontinued  Continue to monitor  Encourage delirium precautions  Psych note reviewed, recommendations appreciated  Patient should be discharged to dementia locked unit  Will need outpatient neuropsych/geriatric evaluation after discharge      VTE Pharmacologic Prophylaxis:   Pharmacologic: Enoxaparin (Lovenox)  Mechanical VTE Prophylaxis in Place: Yes    Patient Centered Rounds: I have performed bedside rounds with nursing staff today  Discussions with Specialists or Other Care Team Provider:  Case management    Education and Discussions with Family / Patient:  Patient's son over the phone    Time Spent for Care: 30 minutes  More than 50% of total time spent on counseling and coordination of care as described above  Current Length of Stay: 1 day(s)    Current Patient Status: Inpatient   Certification Statement: The patient will continue to require additional inpatient hospital stay due to UTI, placement    Discharge Plan:  Monitor urine cultures, pending placement to short-term rehab when medically stable    Code Status: Level 1 - Full Code      Subjective:   Patient seen and examined  No complaints at this time      Objective:     Vitals:   Temp (24hrs), Av 6 °F (36 4 °C), Min:97 52 °F (36 4 °C), Max:97 7 °F (36 5 °C)    Temp:  [97 52 °F (36 4 °C)-97 7 °F (36 5 °C)] 97 52 °F (36 4 °C)  HR:  [63-89] 68  Resp:  [20] 20  BP: (149-174)/(66-94) 151/86  SpO2:  [97 %-99 %] 97 %  Body mass index is 20 42 kg/m²  Input and Output Summary (last 24 hours): Intake/Output Summary (Last 24 hours) at 3/19/2019 1253  Last data filed at 3/19/2019 1032  Gross per 24 hour   Intake 1330 ml   Output 750 ml   Net 580 ml       Physical Exam:     Elderly male, demented not in acute distress alert, appropriately answering questions and following commands  Mucous membranes are moist  Lungs are clear to auscultation  Heart sounds are regular S1-S2  Abdomen soft nontender  Extremities no edema    Additional Data:     Labs:    Results from last 7 days   Lab Units 03/18/19  0447   WBC Thousand/uL 6 97   HEMOGLOBIN g/dL 11 9*   HEMATOCRIT % 35 5*   PLATELETS Thousands/uL 327   NEUTROS PCT % 55   LYMPHS PCT % 26   MONOS PCT % 14*   EOS PCT % 2     Results from last 7 days   Lab Units 03/18/19  0447 03/17/19  1619   POTASSIUM mmol/L 4 2 4 4   CHLORIDE mmol/L 107 103   CO2 mmol/L 25 28   BUN mg/dL 21 18   CREATININE mg/dL 1 21 1 18   CALCIUM mg/dL 8 9 8 8   ALK PHOS U/L  --  60   ALT U/L  --  25   AST U/L  --  11           * I Have Reviewed All Lab Data Listed Above  * Additional Pertinent Lab Tests Reviewed:  [facility] Admission Reviewed    Imaging:    Imaging Reports Reviewed Today Include:  CT CT of the head, CT of the chest abdomen pelvis  Imaging Personally Reviewed by Myself Includes:   Recent Cultures (last 7 days):     Results from last 7 days   Lab Units 03/17/19  2200   URINE CULTURE  >100,000 cfu/ml Gram Negative Shawn Enteric Like*       Last 24 Hours Medication List:     Current Facility-Administered Medications:  amLODIPine 5 mg Oral Daily Kaitlynn Hemphill MD    cefTRIAXone 1,000 mg Intravenous Q24H Joseph Taylor MD Last Rate: 1,000 mg (03/19/19 0336)   enoxaparin 40 mg Subcutaneous Daily Joseph Taylor MD    OLANZapine 5 mg Intramuscular Once LUCI Wong         Today, Patient Was Seen By: Kaitlynn Hemphill, MD    ** Please Note: Dragon 360 Dictation voice to text software may have been used in the creation of this document   **

## 2019-03-20 VITALS
OXYGEN SATURATION: 96 % | HEART RATE: 65 BPM | TEMPERATURE: 97.5 F | WEIGHT: 126.54 LBS | RESPIRATION RATE: 18 BRPM | SYSTOLIC BLOOD PRESSURE: 144 MMHG | HEIGHT: 66 IN | BODY MASS INDEX: 20.34 KG/M2 | DIASTOLIC BLOOD PRESSURE: 78 MMHG

## 2019-03-20 PROBLEM — F03.90 DEMENTIA (HCC): Status: ACTIVE | Noted: 2019-03-20

## 2019-03-20 LAB — BACTERIA UR CULT: ABNORMAL

## 2019-03-20 PROCEDURE — 97112 NEUROMUSCULAR REEDUCATION: CPT

## 2019-03-20 PROCEDURE — 97116 GAIT TRAINING THERAPY: CPT

## 2019-03-20 RX ORDER — CEPHALEXIN 500 MG/1
500 CAPSULE ORAL EVERY 12 HOURS SCHEDULED
Refills: 0
Start: 2019-03-20 | End: 2019-03-25

## 2019-03-20 RX ORDER — AMLODIPINE BESYLATE 5 MG/1
5 TABLET ORAL DAILY
Refills: 0
Start: 2019-03-21

## 2019-03-20 RX ADMIN — CEFTRIAXONE SODIUM 1000 MG: 10 INJECTION, POWDER, FOR SOLUTION INTRAVENOUS at 05:32

## 2019-03-20 RX ADMIN — AMLODIPINE BESYLATE 5 MG: 5 TABLET ORAL at 10:13

## 2019-03-20 RX ADMIN — ENOXAPARIN SODIUM 40 MG: 40 INJECTION SUBCUTANEOUS at 10:14

## 2019-03-20 NOTE — PLAN OF CARE
Problem: PHYSICAL THERAPY ADULT  Goal: Performs mobility at highest level of function for planned discharge setting  See evaluation for individualized goals  Description  Treatment/Interventions: Functional transfer training, LE strengthening/ROM, Therapeutic exercise, Endurance training, Patient/family training, Equipment eval/education, Bed mobility, Gait training, Spoke to nursing, OT          See flowsheet documentation for full assessment, interventions and recommendations  Outcome: Progressing  Note:   Prognosis: Good  Problem List: Impaired balance, Decreased endurance, Decreased cognition, Decreased safety awareness, Impaired judgement  Assessment: Rest periods provided between all activities  No major LOB or unsteadiness  Decreased heelstrike and step length with gait  Some difficulty with higher level commands  Mild fatigue end of session  Barriers to Discharge: Decreased caregiver support     Recommendation: Short-term skilled PT     PT - OK to Discharge: Yes(if supervision or to STR)    See flowsheet documentation for full assessment

## 2019-03-20 NOTE — SOCIAL WORK
Jessica Emmanuel in the Admissions director at [facility], Meghan Lynn [facility] [address], [place], [address] [place] [address] phone# [phone] phoned to inform she obtained auth from "[employer]  Patient has transportation to [employer] today at [facility] today  Patient is informed of transport time  CM phoned son-Nelson to inform of transport time and inform of IMM  IMM is in the chart  Nurse and SLIM notified

## 2019-03-20 NOTE — PLAN OF CARE
Problem: DISCHARGE PLANNING - CARE MANAGEMENT  Goal: Discharge to post-acute care or home with appropriate resources  Description  INTERVENTIONS:  - Conduct assessment to determine patient/family and health care team treatment goals, and need for post-acute services based on payer coverage, community resources, and patient preferences, and barriers to discharge  - Address psychosocial, clinical, and financial barriers to discharge as identified in assessment in conjunction with the patient/family and health care team  - Arrange appropriate level of post-acute services according to patient?s   needs and preference and payer coverage in collaboration with the physician and health care team  - Communicate with and update the patient/family, physician, and health care team regarding progress on the discharge plan  - Arrange appropriate transportation to post-acute venues  Outcome: [address] in the Admissions director at [facility], Meghan Stringer [address] phone# [phone] phoned to inform she obtained auth from [facility]  Patient has transportation to [facility] today at [facility]  Patient is informed of transport time  CM phoned son-Nelson to inform of transport time and inform of IMM  IMM is in the chart  Nurse and SLIM notified

## 2019-03-20 NOTE — DISCHARGE INSTRUCTIONS
Urinary Tract Infection in Men   WHAT YOU NEED TO KNOW:   A urinary tract infection (UTI) is caused by bacteria that get inside your urinary tract  Most bacteria that enter your urinary tract come out when you urinate  If the bacteria stay in your urinary tract, you may get an infection  Your urinary tract includes your kidneys, ureters, bladder, and urethra  Urine is made in your kidneys, and it flows from the ureters to the bladder  Urine leaves the bladder through the urethra  A UTI is more common in your lower urinary tract, which includes your bladder and urethra  DISCHARGE INSTRUCTIONS:   Seek care immediately if:   · You are urinating very little or not at all  · You have a high fever with shaking chills  · You have side or back pain that gets worse  Contact your healthcare provider if:   · You have a mild fever  · You do not feel better after 2 days of taking antibiotics  · You are vomiting  · You have new symptoms, such as blood or pus in your urine  · You have questions or concerns about your condition or care  Medicines:   · Antibiotics  help fight a bacterial infection  · Medicines  may be given to decrease pain and burning when you urinate  They will also help decrease the feeling that you need to urinate often  These medicines will make your urine orange or red  · Take your medicine as directed  Contact your healthcare provider if you think your medicine is not helping or if you have side effects  Tell him or her if you are allergic to any medicine  Keep a list of the medicines, vitamins, and herbs you take  Include the amounts, and when and why you take them  Bring the list or the pill bottles to follow-up visits  Carry your medicine list with you in case of an emergency  Prevent another UTI:   · Empty your bladder often  Urinate and empty your bladder as soon as you feel the need  Do not hold your urine for long periods of time      · Drink liquids as directed  Ask how much liquid to drink each day and which liquids are best for you  You may need to drink more liquids than usual to help flush out the bacteria  Do not drink alcohol, caffeine, or citrus juices  These can irritate your bladder and increase your symptoms  Your healthcare provider may recommend cranberry juice to help prevent a UTI  · Urinate after you have sex  This can help flush out bacteria passed during sex  · Do pelvic muscle exercises often  Pelvic muscle exercises may help you start and stop urinating  Strong pelvic muscles may help you empty your bladder easier  Squeeze these muscles tightly for 5 seconds like you are trying to hold back urine  Then relax for 5 seconds  Gradually work up to squeezing for 10 seconds  Do 3 sets of 15 repetitions a day, or as directed  Follow up with your healthcare provider as directed:  Write down your questions so you remember to ask them during your visits  © 2017 [address]  Information is for End User's use only and may not be sold, redistributed or otherwise used for commercial purposes  All illustrations and images included in CareNotes® are the copyrighted property of [employer] M [employer]  or [employer]  The above information is an  only  It is not intended as medical advice for individual conditions or treatments  Talk to your doctor, nurse or pharmacist before following any medical regimen to see if it is safe and effective for you

## 2019-03-20 NOTE — PHYSICAL THERAPY NOTE
PHYSICAL THERAPY NOTE          Patient Name: Matteo SILVERMAN Date: 3/20/2019     03/20/19 0740   Pain Assessment   Pain Assessment No/denies pain   Pain Score No Pain   Restrictions/Precautions   Weight Bearing Precautions Per Order No   Braces or Orthoses   (none)   Other Precautions Fall Risk;Impulsive;Cognitive; Chair Alarm  (continous pulse ox)   General   Chart Reviewed Yes  (cleared for PT by RN)   Additional Pertinent History noted to no longer have one to one observation   Response to Previous Treatment Patient with no complaints from previous session  Family/Caregiver Present No   Cognition   Overall Cognitive Status Impaired   Arousal/Participation Alert; Cooperative   Attention Within functional limits   Orientation Level Oriented to person;Oriented to time;Disoriented to place; Disoriented to situation  (prompting for place)   Memory Decreased short term memory;Decreased recall of recent events   Following Commands Follows one step commands with increased time or repetition   Comments agreeable to PT   Subjective   Subjective I need to move more  It is easy to stay in this bed  The steps are tough  I have a lot of them  Bed Mobility   Supine to Sit 7  Independent   Additional Comments good sequencing, no problems   Transfers   Sit to Stand 5  Supervision   Stand to Sit 5  Supervision   Additional Comments good sequencing, no problems   Ambulation/Elevation   Gait pattern Improper Weight shift;Decreased foot clearance; Inconsistent yohana; Short stride; Wide CAROLINE   Gait Assistance 5  Supervision   Additional items Verbal cues; Tactile cues   Assistive Device None   Distance 300 feet x 2   Stair Management Assistance 5  Supervision   Additional items Assist x 1;Verbal cues   Stair Management Technique Two rails; Reciprocal   Number of Stairs 8   Balance   Static Sitting Normal   Dynamic Sitting Good   Static Standing Fair +  (to G)   Dynamic Standing Fair +   Ambulatory Fair +   Endurance Deficit   Endurance Deficit No   Activity Tolerance   Activity Tolerance Patient tolerated treatment well  (mild fatigue)   Exercises   Balance training  Multiple dynamic standing balance exer--backward walking, side stepping, heel walking and toe walking all with supervision, tandem stance at best trial 7 sec, SLS at best trial 2 sec, tandem walking with CG to min assist (difficulty understanding), rhomberg stance eyes closed 10 sec at best trial, functional reaching up to 10 inches outside of base of support all planes, alternating forward foot placements to 8 in with close supervision for 2x 10 reps  Gait with multiple turns and changes in direction with close suepervision  Assessment   Prognosis Good   Problem List Impaired balance;Decreased endurance;Decreased cognition;Decreased safety awareness; Impaired judgement   Assessment Rest periods provided between all activities  No major LOB or unsteadiness  Decreased heelstrike and step length with gait  Some difficulty with higher level commands  Mild fatigue end of session  Goals   Patient Goals none stated   STG Expiration Date 03/18/19   Treatment Day 1   Plan   Treatment/Interventions Elevations; Endurance training; Therapeutic exercise;Spoke to nursing  (focus on higher level balance exer)   Progress Progressing toward goals   PT Frequency 2-3x/wk   Recommendation   Recommendation Short-term skilled PT   PT - OK to Discharge Yes  (if supervision or to STR)     Gonzalez Jauregui, PT

## 2019-03-20 NOTE — DISCHARGE SUMMARY
Discharge Summary - [address] [facility]    Patient Information: Tatyana Olmedo 80 y o  male MRN: [id number]  Unit/Bed#: -01 Encounter: [id number]    Discharging Physician / Practitioner: Carrie Herrera MD  PCP: Luis Miguel Alford DO  Admission Date: 3/17/2019  Discharge Date: 03/20/19    Disposition:     Other: Rehab    Reason for Admission:  Urinary tract infection    Discharge Diagnoses:     Principal Problem:    Acute cystitis  Active Problems:    Suicidal ideations  Resolved Problems:    * No resolved hospital problems  *      Consultations During Hospital Stay:  Psychiatric    Procedures Performed:     · None    Significant Findings / Test Results:     · CT of the chest abdomen pelvis  1  No significant abnormality in the chest   2   Mild goiter  3   Two calculi, measuring 5 mm and 6 mm, in the right ureter at the junction of its proximal and mid thirds, where there appears to be a small stricture  Moderate right hydronephrosis  CT head   No acute intracranial abnormality  Incidental Findings:   · None    Test Results Pending at Discharge (will require follow up): · None     Outpatient Tests Requested:  · None    Complications:  None    Hospital Course:     Tatyana Olmedo is a 80 y o  male patient with no significant past medical history other than possible dementia who originally presented to the hospital on 3/17/2019 by his family due to increased depressive symptoms and passive suicidal ideations  Workup revealed positive urinalysis, urine cultures later growing E coli  Patient was initially started on IV antibiotics later transitioned to Keflex on discharge  CT scan of the abdomen demonstrated 2 small stones in left ureter  Patient will need outpatient follow-up with Urology for further management  He did not have any difficulties and voiding/urination  Patient did have some behavioral changes including agitation and aggressive behaviors    He was initially placed on continue observation which was later removed  Patient was evaluated by Psychiatry who recommended placement to snf and will need close outpatient Geriatric evaluation  Condition at Discharge: stable     Discharge Day Visit / Exam:     Subjective:  Patient seen and examined  Did not have any complaints today  Feels better  Anxious for discharge  Vitals: Blood Pressure: 144/78 (03/20/19 1113)  Pulse: 65 (03/19/19 2334)  Temperature: 97 5 °F (36 4 °C) (03/19/19 2334)  Temp Source: Oral (03/19/19 2334)  Respirations: 18 (03/19/19 2334)  Height: 5' 6" (167 6 cm) (03/17/19 1512)  Weight - Scale: 57 4 kg (126 lb 8 7 oz) (03/17/19 1512)  SpO2: 96 % (03/20/19 0405)  Exam:   Physical Exam   Constitutional: He is oriented to person, place, and time  He appears well-developed and well-nourished  No distress  Eyes: Pupils are equal, round, and reactive to light  EOM are normal    Cardiovascular: Normal rate and regular rhythm  Pulmonary/Chest: Effort normal and breath sounds normal    Abdominal: Soft  Bowel sounds are normal    Musculoskeletal: Normal range of motion  Neurological: He is alert and oriented to person, place, and time  Skin: Skin is warm and dry  Discussion with Family:  Attempted to reach patient's son and left a voicemail    Discharge instructions/Information to patient and family:   See after visit summary for information provided to patient and family  Provisions for Follow-Up Care:  See after visit summary for information related to follow-up care and any pertinent home health orders  Planned Readmission:  None     Discharge Statement:  I spent 35 minutes discharging the patient  This time was spent on the day of discharge  I had direct contact with the patient on the day of discharge   Greater than 50% of the total time was spent examining patient, answering all patient questions, arranging and discussing plan of care with patient as well as directly providing post-discharge instructions  Additional time then spent on discharge activities  Discharge Medications:  See after visit summary for reconciled discharge medications provided to patient and family        ** Please Note: This note has been constructed using a voice recognition system **

## 2019-03-21 NOTE — UTILIZATION REVIEW
Notification of Discharge  This is a Notification of Discharge from our facility [address]  Please be advised that this patient has been discharge from our facility  Below you will find the admission and discharge date and time including the patients disposition  PRESENTATION DATE: 3/17/2019  3:07 PM  IP ADMISSION DATE: 3/18/19 1021  DISCHARGE DATE: 3/20/2019  2:13 PM  DISPOSITION: Non [facility] SNF/TCU/SNU    [address] Utilization Review Department  Phone: [phone]; Fax [phone]  [email or web address]  [email or web address]  ATTENTION: Please call with any questions or concerns to [phone]  and carefully listen to the prompts so that you are directed to the right person  Send all requests for admission clinical reviews, approved or denied determinations and any other requests to fax [phone]   All voicemails are confidential

## 2025-01-13 NOTE — ED NOTES
1:1 observation initiated  Pt currently sitting on end of stretcher with lights off  No signs of distress  Will continue to monitor       April C dell Godoy  03/17/19 4354
Admitting provider at bedside        Jose Correa RN  03/18/19 8834
CIS spoke with Abe Ferraro who does not recall meeting with crisis earlier or signing his 12, spoke with PA who contacted his sister  CIS called and spoke to his sister Queenie Martinez who is after for his safety and well being and is going to petition a 36 CIS provided [facility] crisis number      Bharti Carder
CW AT [address] [facility] [address], RN  03/17/19 5935
Crisis spoke with patient        Dania Blankenship RN  03/17/19 8027
FAMILY REQUESTING TO SPEAK TO PROVIDER, PROVIDER MADE AWARE     Ritesh Johnson RN  03/17/19 5196
PROVIDER AT [address] [place] [address] Ne, RN  03/17/19 1586
Patient is a 80year old male brought to the ER by his sister and brother in law  Patient present with a flat affect  He stated that he is depressed and thinks of suicide every other day  When asked what his thoughts were he stated that he wants to fall asleep and not wake up  Patient stated that he is depressed  Patient denies any homicidal, auditory or visual hallucinations  Patient lives alone and stated that he he doesn't remember to care for his ADL's sometimes  And isn't caring for his home he stated what gets done gets done the other stuff has to wait  Spoke with his sister Annabel Hays ([phone]) who stated that she calls him to check in and recently she has noticed a difference in his demeanor when talking on the phone  He stated to her that he was really down and depressed earlier this week, she told him that she and her  would come on Saturday to take him to dinner  When Mervat Vivar arrived at Lynn Hernandez' home her refused to let them in and was yelling for them to leave, they contacted the police who came and convinced Lynn Hernandez to open the door he then came to the ER with them  While completing the assessment patient would forget what question was and talk about something else, he also was not oriented to place and time but knew his name and the president  Stated that he gets down on himself daily and wants to know why he is still alive  He has not seen a doctor in approximately  3-5 years according to his sister and also does not take medication, she feels he would need someone to assist they are concerned of him living alone and not being able to care for himself    Kierra Anderson
Patient up out of bed without gown on stating he will not cooperate with getting transferred to another facility and that he wants to leave the hospital  Reoriented patient to situation, patient back in bed, alert oriented to self, place, and time, safety measures in place, patient denies self harm or harm to others        Chelsea Pike RN  03/17/19 2281
Provider speaking with family     Radha Baird RN  03/17/19 8259
Pt appears to be resting  No signs of distress  Will continue to monitor       April C de Kip Krabbe  03/18/19 58
Pt appears to be resting  No signs of distress  Will continue to monitor       April C dell Santoro  03/18/19 1977
Pt appears to be resting  No signs of distress  Will continue to monitor       April C dell Santoro  03/18/19 3369
Pt appears to be resting on stretcher with lights off  No signs of distress  Will continue to monitor       April FIDELIA Chairez  03/18/19 0000
Pt presents to ed with left sided flank pain, pt states he has not seen a physician in years and does not take any medication also reports no significant medial hx/problems  family also states that he has not seen anyone in years  Pt denies any SI or HI at this time and also linda any depression or feeling sad but family states that they feel the pt has become depressed over the past few months and they are worried about him   Pt is calm, pleasant and cooperative and shows no signs of distress at this time     Radha Baird RN  03/17/19 8550
Received a call from Leena Gastelum at [facility] in regards to Minal Ferreira stated the unit wants to wait until the am to have the doctor review crisis is to contact in AM  MEGAN
SLIM at bedside       Zeynep Worthy QuanDxbernardino  03/18/19 4319
Upon arrival to [facility], Crisis Worker learned that patient had been transferred to the medical floor  No 302 at present  Medical Casemanagement will follow-up as needed once medically cleared  Inpatient staff at [address] [place] [address] Dr Oren Kim) was contacted and advised accordingly to disregard the referral at this time 
shingrix - shingles   
DISPLAY PLAN FREE TEXT